# Patient Record
Sex: MALE | Race: WHITE | Employment: OTHER | URBAN - NONMETROPOLITAN AREA
[De-identification: names, ages, dates, MRNs, and addresses within clinical notes are randomized per-mention and may not be internally consistent; named-entity substitution may affect disease eponyms.]

---

## 2017-01-20 RX ORDER — METFORMIN HYDROCHLORIDE 500 MG/1
TABLET, EXTENDED RELEASE ORAL
Qty: 30 TABLET | Refills: 3 | Status: SHIPPED | OUTPATIENT
Start: 2017-01-20 | End: 2017-12-29

## 2017-01-20 RX ORDER — MOMETASONE FUROATE AND FORMOTEROL FUMARATE DIHYDRATE 200; 5 UG/1; UG/1
AEROSOL RESPIRATORY (INHALATION)
Qty: 13 INHALER | Refills: 5 | Status: SHIPPED | OUTPATIENT
Start: 2017-01-20 | End: 2017-08-02 | Stop reason: SDUPTHER

## 2017-01-20 RX ORDER — ERGOCALCIFEROL 1.25 MG/1
CAPSULE ORAL
Qty: 8 CAPSULE | Refills: 5 | Status: SHIPPED | OUTPATIENT
Start: 2017-01-20 | End: 2017-09-11 | Stop reason: SDUPTHER

## 2017-05-05 ENCOUNTER — OFFICE VISIT (OUTPATIENT)
Dept: FAMILY MEDICINE CLINIC | Age: 42
End: 2017-05-05

## 2017-05-05 VITALS
SYSTOLIC BLOOD PRESSURE: 134 MMHG | RESPIRATION RATE: 18 BRPM | TEMPERATURE: 97.9 F | HEART RATE: 80 BPM | OXYGEN SATURATION: 96 % | WEIGHT: 315 LBS | DIASTOLIC BLOOD PRESSURE: 80 MMHG | HEIGHT: 70 IN | BODY MASS INDEX: 45.1 KG/M2

## 2017-05-05 DIAGNOSIS — E66.01 MORBID OBESITY, UNSPECIFIED OBESITY TYPE (HCC): ICD-10-CM

## 2017-05-05 DIAGNOSIS — J44.9 CHRONIC OBSTRUCTIVE PULMONARY DISEASE, UNSPECIFIED COPD TYPE (HCC): ICD-10-CM

## 2017-05-05 DIAGNOSIS — Z15.89 MTHFR MUTATION (METHYLENETETRAHYDROFOLATE REDUCTASE): ICD-10-CM

## 2017-05-05 DIAGNOSIS — G47.33 OSA (OBSTRUCTIVE SLEEP APNEA): ICD-10-CM

## 2017-05-05 DIAGNOSIS — M79.7 FIBROMYALGIA SYNDROME: ICD-10-CM

## 2017-05-05 DIAGNOSIS — E11.9 TYPE 2 DIABETES MELLITUS WITHOUT COMPLICATION, WITHOUT LONG-TERM CURRENT USE OF INSULIN (HCC): Primary | ICD-10-CM

## 2017-05-05 LAB
CREATININE URINE POCT: 200
HBA1C MFR BLD: 6.9 %
MICROALBUMIN/CREAT 24H UR: 30 MG/G{CREAT}
MICROALBUMIN/CREAT UR-RTO: <30

## 2017-05-05 PROCEDURE — 83036 HEMOGLOBIN GLYCOSYLATED A1C: CPT | Performed by: NURSE PRACTITIONER

## 2017-05-05 PROCEDURE — 99214 OFFICE O/P EST MOD 30 MIN: CPT | Performed by: NURSE PRACTITIONER

## 2017-05-05 PROCEDURE — 82044 UR ALBUMIN SEMIQUANTITATIVE: CPT | Performed by: NURSE PRACTITIONER

## 2017-05-05 RX ORDER — WHEELCHAIR
EACH MISCELLANEOUS
Qty: 1 EACH | Refills: 0 | Status: SHIPPED | OUTPATIENT
Start: 2017-05-05 | End: 2017-12-29 | Stop reason: ALTCHOICE

## 2017-05-05 RX ORDER — ATORVASTATIN CALCIUM 10 MG/1
10 TABLET, FILM COATED ORAL DAILY
Qty: 30 TABLET | Refills: 11 | Status: SHIPPED | OUTPATIENT
Start: 2017-05-05 | End: 2017-12-29 | Stop reason: ALTCHOICE

## 2017-05-05 RX ORDER — LISINOPRIL 2.5 MG/1
2.5 TABLET ORAL DAILY
Qty: 30 TABLET | Refills: 11 | Status: SHIPPED | OUTPATIENT
Start: 2017-05-05 | End: 2017-12-29

## 2017-05-05 ASSESSMENT — PATIENT HEALTH QUESTIONNAIRE - PHQ9
SUM OF ALL RESPONSES TO PHQ9 QUESTIONS 1 & 2: 0
SUM OF ALL RESPONSES TO PHQ QUESTIONS 1-9: 0
1. LITTLE INTEREST OR PLEASURE IN DOING THINGS: 0
2. FEELING DOWN, DEPRESSED OR HOPELESS: 0

## 2017-05-05 ASSESSMENT — ENCOUNTER SYMPTOMS
EYES NEGATIVE: 1
RESPIRATORY NEGATIVE: 1
GASTROINTESTINAL NEGATIVE: 1

## 2017-05-18 RX ORDER — NITROGLYCERIN 0.4 MG/1
TABLET SUBLINGUAL
Qty: 25 TABLET | Refills: 0 | Status: SHIPPED | OUTPATIENT
Start: 2017-05-18

## 2017-06-05 RX ORDER — EPINEPHRINE 0.3 MG/.3ML
0.3 INJECTION SUBCUTANEOUS ONCE
Qty: 0.3 ML | Refills: 0 | Status: SHIPPED | OUTPATIENT
Start: 2017-06-05 | End: 2017-12-29 | Stop reason: SDUPTHER

## 2017-07-08 ENCOUNTER — TELEPHONE (OUTPATIENT)
Dept: ENDOCRINOLOGY | Age: 42
End: 2017-07-08

## 2017-08-02 RX ORDER — MOMETASONE FUROATE AND FORMOTEROL FUMARATE DIHYDRATE 200; 5 UG/1; UG/1
AEROSOL RESPIRATORY (INHALATION)
Qty: 13 INHALER | Refills: 5 | Status: SHIPPED | OUTPATIENT
Start: 2017-08-02 | End: 2018-02-18 | Stop reason: SDUPTHER

## 2017-08-16 ENCOUNTER — TELEPHONE (OUTPATIENT)
Dept: FAMILY MEDICINE CLINIC | Age: 42
End: 2017-08-16

## 2017-08-16 DIAGNOSIS — G47.33 OSA (OBSTRUCTIVE SLEEP APNEA): Primary | ICD-10-CM

## 2017-08-23 ENCOUNTER — OFFICE VISIT (OUTPATIENT)
Dept: FAMILY MEDICINE CLINIC | Age: 42
End: 2017-08-23
Payer: COMMERCIAL

## 2017-08-23 VITALS
SYSTOLIC BLOOD PRESSURE: 146 MMHG | OXYGEN SATURATION: 95 % | HEART RATE: 83 BPM | HEIGHT: 70 IN | WEIGHT: 315 LBS | RESPIRATION RATE: 20 BRPM | BODY MASS INDEX: 45.1 KG/M2 | DIASTOLIC BLOOD PRESSURE: 84 MMHG

## 2017-08-23 DIAGNOSIS — M79.7 FIBROMYALGIA SYNDROME: Primary | ICD-10-CM

## 2017-08-23 DIAGNOSIS — E11.9 TYPE 2 DIABETES MELLITUS WITHOUT COMPLICATION, WITHOUT LONG-TERM CURRENT USE OF INSULIN (HCC): ICD-10-CM

## 2017-08-23 DIAGNOSIS — G47.33 OSA (OBSTRUCTIVE SLEEP APNEA): ICD-10-CM

## 2017-08-23 DIAGNOSIS — F95.2 TOURETTE SYNDROME: ICD-10-CM

## 2017-08-23 DIAGNOSIS — J44.9 CHRONIC OBSTRUCTIVE PULMONARY DISEASE, UNSPECIFIED COPD TYPE (HCC): ICD-10-CM

## 2017-08-23 PROCEDURE — 99214 OFFICE O/P EST MOD 30 MIN: CPT | Performed by: NURSE PRACTITIONER

## 2017-08-23 RX ORDER — BLOOD-GLUCOSE METER
KIT MISCELLANEOUS
Qty: 1 KIT | Refills: 3 | Status: SHIPPED | OUTPATIENT
Start: 2017-08-23

## 2017-08-23 ASSESSMENT — ENCOUNTER SYMPTOMS
GASTROINTESTINAL NEGATIVE: 1
RESPIRATORY NEGATIVE: 1
EYES NEGATIVE: 1

## 2017-09-11 RX ORDER — ERGOCALCIFEROL 1.25 MG/1
CAPSULE ORAL
Qty: 8 CAPSULE | Refills: 5 | Status: SHIPPED | OUTPATIENT
Start: 2017-09-11 | End: 2018-08-06 | Stop reason: SDUPTHER

## 2017-09-19 ENCOUNTER — TELEPHONE (OUTPATIENT)
Dept: FAMILY MEDICINE CLINIC | Age: 42
End: 2017-09-19

## 2017-11-20 ENCOUNTER — TELEPHONE (OUTPATIENT)
Dept: FAMILY MEDICINE CLINIC | Age: 42
End: 2017-11-20

## 2017-11-20 NOTE — TELEPHONE ENCOUNTER
Left message for pt to return call - a letter was received from [facility] that a face to face appt is needed for eval and documentation that pt is benefiting from CPAP therapy - this face to face appt must be scheduled 31 days after initiating therapy and before the 91st day of therapy - Therapy started 10-04-17 - Deadline date is 01-04-18

## 2017-12-04 NOTE — TELEPHONE ENCOUNTER
Left detailed message - Pt's insurance is requiring a face to face appt with PCP to document that pt is benefiting from CPAP therapy - appt must be scheduled prior to 1-4-18 - pt needs to return call to make appt

## 2017-12-06 ENCOUNTER — CARE COORDINATION (OUTPATIENT)
Dept: CARE COORDINATION | Age: 42
End: 2017-12-06

## 2017-12-06 NOTE — CARE COORDINATION
Attempted to reach patient to establish Care Coordination s/p recent letter that was mailed. Patient was not available at the time of my call and generic voicemail message was left asking patient to please return call to my direct number.   Bandar Oneil RN Care Coordinator

## 2017-12-21 ENCOUNTER — TELEPHONE (OUTPATIENT)
Dept: FAMILY MEDICINE CLINIC | Age: 42
End: 2017-12-21

## 2017-12-21 ENCOUNTER — CARE COORDINATION (OUTPATIENT)
Dept: CARE COORDINATION | Age: 42
End: 2017-12-21

## 2017-12-21 NOTE — TELEPHONE ENCOUNTER
Letter mailed to pt - instructed to schedule appt with Liu prior to 1-4-18 - face to face appt required by pt's insurance, [place] for continuation of CPAP    Several attempts made to reach pt by phone

## 2017-12-21 NOTE — CARE COORDINATION
Attempted to reach patient to establish Care Coordination s/p recent letter that was mailed. Patient was not available at the time of my call and generic voicemail message was left asking patient to please return call to my direct number.     Kelsey Eldridge, RN Care Coordinator

## 2017-12-29 ENCOUNTER — OFFICE VISIT (OUTPATIENT)
Dept: FAMILY MEDICINE CLINIC | Age: 42
End: 2017-12-29
Payer: COMMERCIAL

## 2017-12-29 VITALS
DIASTOLIC BLOOD PRESSURE: 82 MMHG | HEIGHT: 70 IN | SYSTOLIC BLOOD PRESSURE: 140 MMHG | HEART RATE: 96 BPM | BODY MASS INDEX: 45.1 KG/M2 | RESPIRATION RATE: 18 BRPM | WEIGHT: 315 LBS

## 2017-12-29 DIAGNOSIS — J44.9 CHRONIC OBSTRUCTIVE PULMONARY DISEASE, UNSPECIFIED COPD TYPE (HCC): ICD-10-CM

## 2017-12-29 DIAGNOSIS — E11.9 TYPE 2 DIABETES MELLITUS WITHOUT COMPLICATION, WITHOUT LONG-TERM CURRENT USE OF INSULIN (HCC): Primary | ICD-10-CM

## 2017-12-29 DIAGNOSIS — F31.9 BIPOLAR 1 DISORDER (HCC): ICD-10-CM

## 2017-12-29 DIAGNOSIS — Z23 NEED FOR INFLUENZA VACCINATION: ICD-10-CM

## 2017-12-29 DIAGNOSIS — Z12.5 SCREENING FOR PROSTATE CANCER: ICD-10-CM

## 2017-12-29 DIAGNOSIS — G47.33 OSA (OBSTRUCTIVE SLEEP APNEA): ICD-10-CM

## 2017-12-29 PROCEDURE — 99214 OFFICE O/P EST MOD 30 MIN: CPT | Performed by: NURSE PRACTITIONER

## 2017-12-29 PROCEDURE — 90686 IIV4 VACC NO PRSV 0.5 ML IM: CPT | Performed by: NURSE PRACTITIONER

## 2017-12-29 PROCEDURE — 90471 IMMUNIZATION ADMIN: CPT | Performed by: NURSE PRACTITIONER

## 2017-12-29 RX ORDER — ATORVASTATIN CALCIUM 10 MG/1
10 TABLET, FILM COATED ORAL DAILY
Qty: 30 TABLET | Refills: 11 | Status: SHIPPED | OUTPATIENT
Start: 2017-12-29 | End: 2019-03-14 | Stop reason: SDUPTHER

## 2017-12-29 RX ORDER — FAMOTIDINE 20 MG/1
20 TABLET, FILM COATED ORAL 2 TIMES DAILY
Qty: 60 TABLET | Refills: 3 | Status: SHIPPED | OUTPATIENT
Start: 2017-12-29 | End: 2019-03-14 | Stop reason: ALTCHOICE

## 2017-12-29 RX ORDER — ALBUTEROL SULFATE 2.5 MG/3ML
SOLUTION RESPIRATORY (INHALATION)
Qty: 75 ML | Refills: 1 | Status: SHIPPED | OUTPATIENT
Start: 2017-12-29 | End: 2018-08-23 | Stop reason: SDUPTHER

## 2017-12-29 RX ORDER — LISINOPRIL 2.5 MG/1
2.5 TABLET ORAL DAILY
Qty: 30 TABLET | Refills: 11 | Status: SHIPPED | OUTPATIENT
Start: 2017-12-29 | End: 2019-03-14 | Stop reason: SDUPTHER

## 2017-12-29 ASSESSMENT — ENCOUNTER SYMPTOMS
EYES NEGATIVE: 1
GASTROINTESTINAL NEGATIVE: 1
RESPIRATORY NEGATIVE: 1

## 2017-12-29 NOTE — PROGRESS NOTES
Subjective:      Patient ID: Robert Herman is a 38 y.o. male. HPI  Chief Complaint   Patient presents with    Other     f/u CPAP approval      Patient's medications, allergies, past medical, surgical, social and family histories were reviewed and updated as appropriate.   Patient Active Problem List   Diagnosis    Fibromyalgia syndrome    Tourette syndrome    Asthma    Restless legs syndrome (RLS)    Irritable bowel syndrome    Arthritis- multiple joints-under [facility] it by [facility]    Adrenal adenoma- under evaluation and F/[facility]    Chest pain, atypical -Noncardiac    Abnormal nuclear cardiac imaging test-borderline for ischemia in the anterior wall-with negative cath    Tobacco dependence    Morbid [facility]    S/P cardiac cath- may 2012- minimal nonobstructive CAD- prox and ostial LAD    Cardiomyopathy- nonischemic-EF 40% by echo and cath 52% by [facility]    Hypogonadism male    COPD (chronic obstructive pulmonary disease) ([facility]    MTHFR mutation (methylenetetrahydrofolate reductase) ([facility]    Bipolar 1 disorder ([facility]    Schizophrenia ([facility]    Obesity due to excess calories    Type 2 diabetes mellitus without complication, without long-term current use of insulin ([facility]    Adrenal adenoma     Allergies   Allergen Reactions    Allopurinol      Sore throat      Olanzapine     Seroquel [Quetiapine Fumarate]      Health Maintenance   Topic Date Due    HIV screen  04/18/1988    DTaP/Tdap/Td vaccine (1 - Tdap) 04/18/1992    Lipid screen  06/24/2017    Flu vaccine (1) 09/01/2017    Diabetic retinal exam  09/26/2017    Diabetic hemoglobin A1C test  05/05/2018    Diabetic microalbuminuria test  05/05/2018    Diabetic foot exam  08/23/2018    Pneumococcal med risk  Completed     Current Outpatient Prescriptions   Medication Sig Dispense Refill    PROAIR  (90 Base) MCG/ACT inhaler inhale 2 puffs by mouth every 6 hours if needed for wheezing Negative. Musculoskeletal: Positive for arthralgias. Skin: Negative. Neurological: Negative. Psychiatric/Behavioral: Positive for hallucinations. The patient is nervous/anxious. Objective:   Physical Exam   Constitutional: He is oriented to person, place, and time. He appears well-developed and well-nourished. Morbidly obese   HENT:   Head: Normocephalic. Right Ear: Tympanic membrane and external ear normal.   Left Ear: Tympanic membrane and external ear normal.   Nose: Nose normal.   Mouth/Throat: Oropharynx is clear and moist.   Neck: Normal range of motion. Neck supple. Cardiovascular: Normal rate, regular rhythm, normal heart sounds and intact distal pulses. Exam reveals no gallop and no friction rub. No murmur heard. Pulmonary/Chest: Effort normal and breath sounds normal. He has no wheezes. He has no rales. Abdominal: Soft. Bowel sounds are normal. There is no tenderness. There is no guarding. Musculoskeletal: Normal range of motion. Lymphadenopathy:     He has no cervical adenopathy. Neurological: He is alert and oriented to person, place, and time. He has normal reflexes. Skin: Skin is warm. Psychiatric: His mood appears anxious. Agitated: tardive dyskinesia        Assessment:      1. Type 2 diabetes mellitus without complication, without long-term current use of insulin (HCC)  lisinopril (ZESTRIL) 2.5 MG tablet    atorvastatin (LIPITOR) 10 MG tablet    CBC Auto Differential    Comprehensive Metabolic Panel    Lipid Panel    Hemoglobin A1C    Microalbumin / Creatinine Urine Ratio   2. Need for influenza vaccination  INFLUENZA, QUADV, 3 YRS AND OLDER, IM, PF, PREFILL SYR OR SDV, 0.5ML (FLUZONE QUADV, PF)   3. GLORIA (obstructive sleep apnea)     4. Chronic obstructive pulmonary disease, unspecified COPD type ([address]     5. Bipolar 1 disorder ([address]     6.  Screening for prostate cancer  PSA Screening           Plan:      Pt to cont the cpap  Get labs  Need to restart ace and statin  Fu in 3months

## 2017-12-29 NOTE — PROGRESS NOTES
After obtaining consent, and per orders of Liu Washington CNP, injection of Fluzone 0.5mL IM given in Right deltoid by Michael Officer. Patient instructed to report any adverse reaction to me immediately. VIS given. Consent signed.

## 2018-01-09 ENCOUNTER — CARE COORDINATION (OUTPATIENT)
Dept: CARE COORDINATION | Age: 43
End: 2018-01-09

## 2018-01-09 NOTE — LETTER
1/11/2018    [address] [facility] [address] [id number]    I am following up on my previous contact. I wanted to introduce myself and the care coordination program offered here at [facility], NP's office. Our goal is to support you in your efforts to be as healthy as possible. [facility] ([facility] is committed to walk with you on your journey to better health. Please let me know if you have any questions or if you would like to schedule an appointment with me. You can reach me at the numbers listed below.      In good health,     Regards,      Bridger Vogt, RN   [phone]

## 2018-01-15 RX ORDER — TIOTROPIUM BROMIDE INHALATION SPRAY 3.12 UG/1
SPRAY, METERED RESPIRATORY (INHALATION)
Qty: 4 G | Refills: 11 | Status: SHIPPED | OUTPATIENT
Start: 2018-01-15 | End: 2019-03-14 | Stop reason: SDUPTHER

## 2018-01-16 ENCOUNTER — TELEPHONE (OUTPATIENT)
Dept: ENDOCRINOLOGY | Age: 43
End: 2018-01-16

## 2018-02-19 RX ORDER — MOMETASONE FUROATE AND FORMOTEROL FUMARATE DIHYDRATE 200; 5 UG/1; UG/1
AEROSOL RESPIRATORY (INHALATION)
Qty: 13 G | Refills: 5 | Status: SHIPPED | OUTPATIENT
Start: 2018-02-19 | End: 2018-08-16 | Stop reason: SDUPTHER

## 2018-05-11 ENCOUNTER — TELEPHONE (OUTPATIENT)
Dept: FAMILY MEDICINE CLINIC | Age: 43
End: 2018-05-11

## 2018-05-15 ENCOUNTER — TELEPHONE (OUTPATIENT)
Dept: FAMILY MEDICINE CLINIC | Age: 43
End: 2018-05-15

## 2018-08-07 RX ORDER — ERGOCALCIFEROL 1.25 MG/1
CAPSULE ORAL
Qty: 8 CAPSULE | Refills: 5 | Status: SHIPPED | OUTPATIENT
Start: 2018-08-07 | End: 2019-09-14 | Stop reason: SDUPTHER

## 2018-08-18 RX ORDER — MOMETASONE FUROATE AND FORMOTEROL FUMARATE DIHYDRATE 200; 5 UG/1; UG/1
AEROSOL RESPIRATORY (INHALATION)
Qty: 13 G | Refills: 5 | Status: SHIPPED | OUTPATIENT
Start: 2018-08-18 | End: 2019-03-14 | Stop reason: SDUPTHER

## 2018-08-23 RX ORDER — ALBUTEROL SULFATE 2.5 MG/3ML
SOLUTION RESPIRATORY (INHALATION)
Qty: 75 ML | Refills: 1 | Status: SHIPPED | OUTPATIENT
Start: 2018-08-23 | End: 2019-01-04 | Stop reason: SDUPTHER

## 2018-12-05 ENCOUNTER — TELEPHONE (OUTPATIENT)
Dept: FAMILY MEDICINE CLINIC | Age: 43
End: 2018-12-05

## 2019-01-07 RX ORDER — ALBUTEROL SULFATE 2.5 MG/3ML
SOLUTION RESPIRATORY (INHALATION)
Qty: 75 ML | Refills: 1 | Status: SHIPPED | OUTPATIENT
Start: 2019-01-07 | End: 2020-06-22

## 2019-02-28 RX ORDER — TIOTROPIUM BROMIDE INHALATION SPRAY 3.12 UG/1
SPRAY, METERED RESPIRATORY (INHALATION)
Qty: 4 G | Refills: 11 | OUTPATIENT
Start: 2019-02-28

## 2019-03-14 ENCOUNTER — OFFICE VISIT (OUTPATIENT)
Dept: FAMILY MEDICINE CLINIC | Age: 44
End: 2019-03-14
Payer: COMMERCIAL

## 2019-03-14 VITALS
TEMPERATURE: 97.6 F | SYSTOLIC BLOOD PRESSURE: 138 MMHG | DIASTOLIC BLOOD PRESSURE: 84 MMHG | HEART RATE: 80 BPM | WEIGHT: 315 LBS | RESPIRATION RATE: 16 BRPM | HEIGHT: 70 IN | BODY MASS INDEX: 45.1 KG/M2

## 2019-03-14 DIAGNOSIS — K58.0 IRRITABLE BOWEL SYNDROME WITH DIARRHEA: ICD-10-CM

## 2019-03-14 DIAGNOSIS — J44.9 CHRONIC OBSTRUCTIVE PULMONARY DISEASE, UNSPECIFIED COPD TYPE (HCC): ICD-10-CM

## 2019-03-14 DIAGNOSIS — M79.7 FIBROMYALGIA SYNDROME: ICD-10-CM

## 2019-03-14 DIAGNOSIS — F95.2 TOURETTE SYNDROME: ICD-10-CM

## 2019-03-14 DIAGNOSIS — G47.33 OSA (OBSTRUCTIVE SLEEP APNEA): ICD-10-CM

## 2019-03-14 DIAGNOSIS — F31.9 BIPOLAR 1 DISORDER (HCC): ICD-10-CM

## 2019-03-14 DIAGNOSIS — E11.9 TYPE 2 DIABETES MELLITUS WITHOUT COMPLICATION, WITHOUT LONG-TERM CURRENT USE OF INSULIN (HCC): Primary | ICD-10-CM

## 2019-03-14 LAB — HBA1C MFR BLD: 8.2 %

## 2019-03-14 PROCEDURE — 99214 OFFICE O/P EST MOD 30 MIN: CPT | Performed by: NURSE PRACTITIONER

## 2019-03-14 PROCEDURE — 83036 HEMOGLOBIN GLYCOSYLATED A1C: CPT | Performed by: NURSE PRACTITIONER

## 2019-03-14 RX ORDER — LISINOPRIL 2.5 MG/1
2.5 TABLET ORAL DAILY
Qty: 30 TABLET | Refills: 11 | Status: SHIPPED | OUTPATIENT
Start: 2019-03-14 | End: 2020-04-15

## 2019-03-14 RX ORDER — DICYCLOMINE HYDROCHLORIDE 10 MG/1
10 CAPSULE ORAL 4 TIMES DAILY
Qty: 360 CAPSULE | Refills: 1 | Status: SHIPPED | OUTPATIENT
Start: 2019-03-14 | End: 2019-09-14 | Stop reason: SDUPTHER

## 2019-03-14 RX ORDER — ATORVASTATIN CALCIUM 10 MG/1
10 TABLET, FILM COATED ORAL DAILY
Qty: 30 TABLET | Refills: 11 | Status: SHIPPED | OUTPATIENT
Start: 2019-03-14 | End: 2020-04-15

## 2019-03-14 RX ORDER — ALBUTEROL SULFATE 90 UG/1
AEROSOL, METERED RESPIRATORY (INHALATION)
Qty: 8.5 G | Refills: 5 | Status: SHIPPED | OUTPATIENT
Start: 2019-03-14 | End: 2019-07-24 | Stop reason: SDUPTHER

## 2019-03-14 ASSESSMENT — ENCOUNTER SYMPTOMS
ABDOMINAL PAIN: 1
RESPIRATORY NEGATIVE: 1
EYES NEGATIVE: 1
DIARRHEA: 1

## 2019-07-24 DIAGNOSIS — J44.9 CHRONIC OBSTRUCTIVE PULMONARY DISEASE, UNSPECIFIED COPD TYPE (HCC): ICD-10-CM

## 2019-07-24 RX ORDER — ALBUTEROL SULFATE 90 UG/1
AEROSOL, METERED RESPIRATORY (INHALATION)
Qty: 8.5 G | Refills: 5 | Status: SHIPPED | OUTPATIENT
Start: 2019-07-24 | End: 2019-11-19 | Stop reason: SDUPTHER

## 2019-09-14 DIAGNOSIS — E11.9 TYPE 2 DIABETES MELLITUS WITHOUT COMPLICATION, WITHOUT LONG-TERM CURRENT USE OF INSULIN (HCC): ICD-10-CM

## 2019-09-14 DIAGNOSIS — K58.0 IRRITABLE BOWEL SYNDROME WITH DIARRHEA: ICD-10-CM

## 2019-09-16 RX ORDER — DICYCLOMINE HYDROCHLORIDE 10 MG/1
CAPSULE ORAL
Qty: 360 CAPSULE | Refills: 1 | Status: SHIPPED | OUTPATIENT
Start: 2019-09-16 | End: 2020-04-15

## 2019-09-16 RX ORDER — ERGOCALCIFEROL 1.25 MG/1
CAPSULE ORAL
Qty: 8 CAPSULE | Refills: 5 | Status: SHIPPED | OUTPATIENT
Start: 2019-09-16 | End: 2020-02-17 | Stop reason: SDUPTHER

## 2019-10-13 DIAGNOSIS — J44.9 CHRONIC OBSTRUCTIVE PULMONARY DISEASE, UNSPECIFIED COPD TYPE (HCC): ICD-10-CM

## 2019-11-19 DIAGNOSIS — J44.9 CHRONIC OBSTRUCTIVE PULMONARY DISEASE, UNSPECIFIED COPD TYPE (HCC): ICD-10-CM

## 2019-11-19 RX ORDER — ALBUTEROL SULFATE 90 UG/1
AEROSOL, METERED RESPIRATORY (INHALATION)
Qty: 8.5 G | Refills: 0 | Status: SHIPPED | OUTPATIENT
Start: 2019-11-19 | End: 2019-12-07 | Stop reason: SDUPTHER

## 2019-12-07 DIAGNOSIS — J44.9 CHRONIC OBSTRUCTIVE PULMONARY DISEASE, UNSPECIFIED COPD TYPE (HCC): ICD-10-CM

## 2019-12-09 RX ORDER — ALBUTEROL SULFATE 90 UG/1
AEROSOL, METERED RESPIRATORY (INHALATION)
Qty: 8.5 G | Refills: 0 | Status: SHIPPED | OUTPATIENT
Start: 2019-12-09 | End: 2019-12-26

## 2019-12-26 DIAGNOSIS — J44.9 CHRONIC OBSTRUCTIVE PULMONARY DISEASE, UNSPECIFIED COPD TYPE (HCC): ICD-10-CM

## 2019-12-26 RX ORDER — ALBUTEROL SULFATE 90 UG/1
AEROSOL, METERED RESPIRATORY (INHALATION)
Qty: 8.5 G | Refills: 0 | Status: SHIPPED | OUTPATIENT
Start: 2019-12-26 | End: 2020-01-13

## 2020-01-13 RX ORDER — ALBUTEROL SULFATE 90 UG/1
AEROSOL, METERED RESPIRATORY (INHALATION)
Qty: 8.5 G | Refills: 0 | Status: SHIPPED | OUTPATIENT
Start: 2020-01-13 | End: 2020-01-30

## 2020-01-30 RX ORDER — ALBUTEROL SULFATE 90 UG/1
AEROSOL, METERED RESPIRATORY (INHALATION)
Qty: 8.5 G | Refills: 0 | Status: SHIPPED | OUTPATIENT
Start: 2020-01-30 | End: 2020-02-24

## 2020-02-12 ENCOUNTER — OFFICE VISIT (OUTPATIENT)
Dept: FAMILY MEDICINE CLINIC | Age: 45
End: 2020-02-12
Payer: COMMERCIAL

## 2020-02-12 ENCOUNTER — NURSE ONLY (OUTPATIENT)
Dept: LAB | Age: 45
End: 2020-02-12

## 2020-02-12 VITALS
TEMPERATURE: 97.3 F | SYSTOLIC BLOOD PRESSURE: 132 MMHG | HEIGHT: 70 IN | HEART RATE: 88 BPM | RESPIRATION RATE: 16 BRPM | WEIGHT: 315 LBS | DIASTOLIC BLOOD PRESSURE: 88 MMHG | BODY MASS INDEX: 45.1 KG/M2

## 2020-02-12 LAB
ALBUMIN SERPL-MCNC: 4.3 G/DL (ref 3.5–5.1)
ALP BLD-CCNC: 76 U/L (ref 38–126)
ALT SERPL-CCNC: 27 U/L (ref 11–66)
ANION GAP SERPL CALCULATED.3IONS-SCNC: 12 MEQ/L (ref 8–16)
AST SERPL-CCNC: 20 U/L (ref 5–40)
BASOPHILS # BLD: 0.7 %
BASOPHILS ABSOLUTE: 0.1 THOU/MM3 (ref 0–0.1)
BILIRUB SERPL-MCNC: 0.4 MG/DL (ref 0.3–1.2)
BUN BLDV-MCNC: 15 MG/DL (ref 7–22)
CALCIUM SERPL-MCNC: 9 MG/DL (ref 8.5–10.5)
CHLORIDE BLD-SCNC: 103 MEQ/L (ref 98–111)
CHOLESTEROL, TOTAL: 177 MG/DL (ref 100–199)
CO2: 26 MEQ/L (ref 23–33)
CREAT SERPL-MCNC: 0.7 MG/DL (ref 0.4–1.2)
CREATININE, URINE: 161.6 MG/DL
EOSINOPHIL # BLD: 1.3 %
EOSINOPHILS ABSOLUTE: 0.1 THOU/MM3 (ref 0–0.4)
ERYTHROCYTE [DISTWIDTH] IN BLOOD BY AUTOMATED COUNT: 14.3 % (ref 11.5–14.5)
ERYTHROCYTE [DISTWIDTH] IN BLOOD BY AUTOMATED COUNT: 53.5 FL (ref 35–45)
GFR SERPL CREATININE-BSD FRML MDRD: > 90 ML/MIN/1.73M2
GLUCOSE BLD-MCNC: 126 MG/DL (ref 70–108)
HCT VFR BLD CALC: 53.9 % (ref 42–52)
HDLC SERPL-MCNC: 33 MG/DL
HEMOGLOBIN: 17.3 GM/DL (ref 14–18)
IMMATURE GRANS (ABS): 0.03 THOU/MM3 (ref 0–0.07)
IMMATURE GRANULOCYTES: 0.3 %
LDL CHOLESTEROL CALCULATED: 105 MG/DL
LYMPHOCYTES # BLD: 23.3 %
LYMPHOCYTES ABSOLUTE: 2.4 THOU/MM3 (ref 1–4.8)
MCH RBC QN AUTO: 32.3 PG (ref 26–33)
MCHC RBC AUTO-ENTMCNC: 32.1 GM/DL (ref 32.2–35.5)
MCV RBC AUTO: 100.7 FL (ref 80–94)
MICROALBUMIN UR-MCNC: < 1.2 MG/DL
MICROALBUMIN/CREAT UR-RTO: 7 MG/G (ref 0–30)
MONOCYTES # BLD: 6.9 %
MONOCYTES ABSOLUTE: 0.7 THOU/MM3 (ref 0.4–1.3)
NUCLEATED RED BLOOD CELLS: 0 /100 WBC
PLATELET # BLD: 265 THOU/MM3 (ref 130–400)
PMV BLD AUTO: 9.7 FL (ref 9.4–12.4)
POTASSIUM SERPL-SCNC: 4.3 MEQ/L (ref 3.5–5.2)
RBC # BLD: 5.35 MILL/MM3 (ref 4.7–6.1)
SEG NEUTROPHILS: 67.5 %
SEGMENTED NEUTROPHILS ABSOLUTE COUNT: 7 THOU/MM3 (ref 1.8–7.7)
SODIUM BLD-SCNC: 141 MEQ/L (ref 135–145)
TOTAL PROTEIN: 7.4 G/DL (ref 6.1–8)
TRIGL SERPL-MCNC: 193 MG/DL (ref 0–199)
WBC # BLD: 10.4 THOU/MM3 (ref 4.8–10.8)

## 2020-02-12 PROCEDURE — 99214 OFFICE O/P EST MOD 30 MIN: CPT | Performed by: NURSE PRACTITIONER

## 2020-02-12 RX ORDER — FLUOCINOLONE ACETONIDE 0.1 MG/ML
SOLUTION TOPICAL
Qty: 90 ML | Refills: 1 | Status: SHIPPED | OUTPATIENT
Start: 2020-02-12 | End: 2021-05-11

## 2020-02-12 ASSESSMENT — ENCOUNTER SYMPTOMS
COLOR CHANGE: 1
EYES NEGATIVE: 1
GASTROINTESTINAL NEGATIVE: 1
RESPIRATORY NEGATIVE: 1

## 2020-02-12 NOTE — PROGRESS NOTES
mometasone-formoterol (DULERA) 200-5 MCG/ACT inhaler inhale 2 puffs by mouth every 12 hours 13 g 5    vitamin D (ERGOCALCIFEROL) 13842 units CAPS capsule TAKE 1 CAPSULE BY MOUTH TWO TIMES PER WEEK 8 capsule 5    tiotropium (SPIRIVA RESPIMAT) 2.5 MCG/ACT AERS inhaler inhale 2 puffs by mouth once daily 4 g 11    albuterol (PROVENTIL) (2.5 MG/3ML) 0.083% nebulizer solution inhale contents of 1 vial in nebulizer every 6 hours if needed 75 mL 1    glucose monitoring kit (FREESTYLE) monitoring kit Dispense glucometer with test strips and lancets. #100, check BS daily 1 kit 3    NITROSTAT 0.4 MG SL tablet place 1 tablet under the tongue if needed every 5 minutes for chest pain for 3 doses IF NO RELIEF AFTER 3RD DOSE CALL PRESCRIBER . 25 tablet 0    Respiratory Therapy Supplies ISAIAH cpap and supplies, dx GLORIA 1 Device 0    Nebulizers (COMPRESSOR/NEBULIZER) MISC Nebulizer with tubing dx asthma 1 each 3    EPINEPHrine (EPIPEN) 0.3 MG/0.3ML ISAIAH injection Inject 0.3 mLs into the muscle once as needed for 1 dose. 1 Device 3    dicyclomine (BENTYL) 10 MG capsule take 1 capsule by mouth four times a day (Patient not taking: Reported on 2/12/2020) 360 capsule 1    lisinopril (ZESTRIL) 2.5 MG tablet Take 1 tablet by mouth daily (Patient not taking: Reported on 2/12/2020) 30 tablet 11    atorvastatin (LIPITOR) 10 MG tablet Take 1 tablet by mouth daily (Patient not taking: Reported on 2/12/2020) 30 tablet 11    canagliflozin (INVOKANA) 100 MG TABS tablet Take 1 tablet by mouth every morning (before breakfast) (Patient not taking: Reported on 2/12/2020) 90 tablet 1    omeprazole (PRILOSEC) 20 MG capsule Take 20 mg by mouth daily as needed        No current facility-administered medications for this visit.       Allergies   Allergen Reactions    Allopurinol      Sore throat      Olanzapine     Quetiapine     Seroquel [Quetiapine Fumarate]      Health Maintenance   Topic Date Due    DTaP/Tdap/Td vaccine (1 - Tdap) Normal range of motion. Feet:      Right foot:      Skin integrity: No ulcer, blister or skin breakdown. Left foot:      Skin integrity: No ulcer, blister or skin breakdown. Lymphadenopathy:      Cervical: No cervical adenopathy. Skin:     General: Skin is warm. Neurological:      Mental Status: He is alert and oriented to person, place, and time. Deep Tendon Reflexes: Reflexes are normal and symmetric. Assessment:      Diagnosis Orders   1. Type 2 diabetes mellitus without complication, without long-term current use of insulin ([facility])   DIABETES FOOT EXAM    CBC Auto Differential    Comprehensive Metabolic Panel    Hemoglobin A1C    Albumin, Random Urine    Lipid Panel    Vitamin D 25 Hydroxy   2. Chronic obstructive pulmonary disease, unspecified COPD type ([facility] [facility]     3. Irritable bowel syndrome with diarrhea     4. Fibromyalgia syndrome     5. Eczema of both external ears  fluocinolone acetonide (SYNALAR) 0.01 % external solution   6. Screening for prostate cancer  PSA Screening   7. Hypogonadism male  Testosterone   8. Vitamin D deficiency     9. Skin lesion         Plan:      No follow-ups on file.        Orders Placed This Encounter   Procedures    CBC Auto Differential     Standing Status:   Future     Number of Occurrences:   1     Standing Expiration Date:   2/11/2021    Comprehensive Metabolic Panel     Standing Status:   Future     Number of Occurrences:   1     Standing Expiration Date:   2/11/2021    Hemoglobin A1C     Standing Status:   Future     Number of Occurrences:   1     Standing Expiration Date:   2/11/2021    Albumin, Random Urine     Standing Status:   Future     Number of Occurrences:   1     Standing Expiration Date:   2/11/2021    Lipid Panel     Standing Status:   Future     Number of Occurrences:   1     Standing Expiration Date:   2/11/2021     Order Specific Question:   Is Patient Fasting?/# of Hours     Answer:   15    PSA Screening     Standing

## 2020-02-13 LAB
AVERAGE GLUCOSE: 150 MG/DL (ref 70–126)
HBA1C MFR BLD: 7 % (ref 4.4–6.4)
PROSTATE SPECIFIC ANTIGEN: 0.36 NG/ML (ref 0–1)
VITAMIN D 25-HYDROXY: 14 NG/ML (ref 30–100)

## 2020-02-15 LAB — TESTOSTERONE TOTAL: 348 NG/DL (ref 300–890)

## 2020-02-17 RX ORDER — ERGOCALCIFEROL 1.25 MG/1
CAPSULE ORAL
Qty: 8 CAPSULE | Refills: 5 | Status: SHIPPED | OUTPATIENT
Start: 2020-02-17 | End: 2021-03-01

## 2020-02-24 RX ORDER — ALBUTEROL SULFATE 90 UG/1
AEROSOL, METERED RESPIRATORY (INHALATION)
Qty: 8.5 G | Refills: 0 | Status: SHIPPED | OUTPATIENT
Start: 2020-02-24 | End: 2020-03-16

## 2020-03-16 RX ORDER — ALBUTEROL SULFATE 90 UG/1
AEROSOL, METERED RESPIRATORY (INHALATION)
Qty: 8.5 G | Refills: 0 | Status: SHIPPED | OUTPATIENT
Start: 2020-03-16 | End: 2020-04-02

## 2020-03-18 ENCOUNTER — PATIENT MESSAGE (OUTPATIENT)
Dept: FAMILY MEDICINE CLINIC | Age: 45
End: 2020-03-18

## 2020-04-02 RX ORDER — ALBUTEROL SULFATE 90 UG/1
AEROSOL, METERED RESPIRATORY (INHALATION)
Qty: 18 G | Refills: 3 | Status: SHIPPED | OUTPATIENT
Start: 2020-04-02 | End: 2020-06-22 | Stop reason: SDUPTHER

## 2020-04-14 ENCOUNTER — E-VISIT (OUTPATIENT)
Dept: FAMILY MEDICINE CLINIC | Age: 45
End: 2020-04-14
Payer: COMMERCIAL

## 2020-04-14 PROCEDURE — 98970 NQHP OL DIG ASSMT&MGMT 5-10: CPT | Performed by: NURSE PRACTITIONER

## 2020-04-15 ENCOUNTER — OFFICE VISIT (OUTPATIENT)
Dept: PRIMARY CARE CLINIC | Age: 45
End: 2020-04-15
Payer: COMMERCIAL

## 2020-04-15 VITALS
HEART RATE: 94 BPM | RESPIRATION RATE: 16 BRPM | HEIGHT: 70 IN | TEMPERATURE: 97.2 F | WEIGHT: 315 LBS | SYSTOLIC BLOOD PRESSURE: 130 MMHG | BODY MASS INDEX: 45.1 KG/M2 | DIASTOLIC BLOOD PRESSURE: 86 MMHG

## 2020-04-15 PROCEDURE — 99213 OFFICE O/P EST LOW 20 MIN: CPT | Performed by: FAMILY MEDICINE

## 2020-04-15 NOTE — PROGRESS NOTES
middle of the lower back. Lesion on left upper back with patient's observations stated as increasing diameter, increasing thickness, darkening color, exam of this area shows suspicious lesion pigmented uneven, raised, border irregular and asymmetrical size 15x15 mm noted on the right upper back. Skin tag on the left lower back irritated by clothing    Assessment:      Sonya Vogt was seen today for mole.     Diagnoses and all orders for this visit:    Skin lesion of back    Skin lesion    Skin lesion on examination    Skin tag      Schedule excision    Juan Buchanan MD

## 2020-04-28 ENCOUNTER — OFFICE VISIT (OUTPATIENT)
Dept: PRIMARY CARE CLINIC | Age: 45
End: 2020-04-28
Payer: COMMERCIAL

## 2020-04-28 VITALS
SYSTOLIC BLOOD PRESSURE: 132 MMHG | RESPIRATION RATE: 16 BRPM | HEART RATE: 92 BPM | DIASTOLIC BLOOD PRESSURE: 82 MMHG | BODY MASS INDEX: 55.55 KG/M2 | TEMPERATURE: 97.8 F | WEIGHT: 315 LBS

## 2020-04-28 PROCEDURE — 11200 RMVL SKIN TAGS UP TO&INC 15: CPT | Performed by: FAMILY MEDICINE

## 2020-04-28 PROCEDURE — 11300 SHAVE SKIN LESION 0.5 CM/<: CPT | Performed by: FAMILY MEDICINE

## 2020-04-28 PROCEDURE — 11301 SHAVE SKIN LESION 0.6-1.0 CM: CPT | Performed by: FAMILY MEDICINE

## 2020-04-28 PROCEDURE — 11303 SHAVE SKIN LESION >2.0 CM: CPT | Performed by: FAMILY MEDICINE

## 2020-04-28 RX ORDER — CEPHALEXIN 500 MG/1
500 CAPSULE ORAL 3 TIMES DAILY
Qty: 21 CAPSULE | Refills: 0 | Status: SHIPPED | OUTPATIENT
Start: 2020-04-28 | End: 2020-05-05

## 2020-04-28 NOTE — PROGRESS NOTES
without complications. Follow up: the specimen is labeled and sent to pathology for evaluation, return for suture removal in 10 days.

## 2020-05-08 ENCOUNTER — PROCEDURE VISIT (OUTPATIENT)
Dept: FAMILY MEDICINE CLINIC | Age: 45
End: 2020-05-08
Payer: COMMERCIAL

## 2020-05-08 VITALS
RESPIRATION RATE: 22 BRPM | OXYGEN SATURATION: 97 % | DIASTOLIC BLOOD PRESSURE: 70 MMHG | SYSTOLIC BLOOD PRESSURE: 132 MMHG | HEART RATE: 85 BPM

## 2020-05-08 PROCEDURE — 11200 RMVL SKIN TAGS UP TO&INC 15: CPT | Performed by: FAMILY MEDICINE

## 2020-05-08 NOTE — PROGRESS NOTES
SUBJECTIVE:   Barry Mckeon is a 50 y.o. male who presents for lesion removal. We have already discussed this procedure, including option of not performing surgery, technique of surgery and potential for scarring at a recent visit. OBJECTIVE:   Patient appears well. Vitals are normal.  Skin: large skin tags under the right axilla  2 are irritated with clothing and frequently bleed     ASSESSMENT:   Skin tags    PLAN:   After informed consent was obtained, using Betadine for cleansing and 1% Lidocaine with epinephrine for anesthetic, with sterile technique, shave excision was performed. Cautery was obtained with dry sol solution. Antibiotic dressing is applied, and wound care instructions provided. Be alert for any signs of cutaneous infection. The procedure was well tolerated without complications. Follow up: the patient may return prn.

## 2020-06-22 RX ORDER — ALBUTEROL SULFATE 90 UG/1
AEROSOL, METERED RESPIRATORY (INHALATION)
Qty: 18 G | Refills: 3 | Status: SHIPPED | OUTPATIENT
Start: 2020-06-22 | End: 2020-09-02

## 2020-06-22 RX ORDER — ALBUTEROL SULFATE 2.5 MG/3ML
SOLUTION RESPIRATORY (INHALATION)
Qty: 75 ML | Refills: 1 | Status: SHIPPED | OUTPATIENT
Start: 2020-06-22 | End: 2020-11-06

## 2020-09-02 RX ORDER — ALBUTEROL SULFATE 90 UG/1
AEROSOL, METERED RESPIRATORY (INHALATION)
Qty: 18 G | Refills: 3 | Status: SHIPPED | OUTPATIENT
Start: 2020-09-02 | End: 2020-11-17

## 2020-11-06 RX ORDER — ALBUTEROL SULFATE 2.5 MG/3ML
SOLUTION RESPIRATORY (INHALATION)
Qty: 75 ML | Refills: 1 | Status: SHIPPED | OUTPATIENT
Start: 2020-11-06 | End: 2021-03-30

## 2020-11-17 RX ORDER — ALBUTEROL SULFATE 90 UG/1
AEROSOL, METERED RESPIRATORY (INHALATION)
Qty: 18 G | Refills: 3 | Status: SHIPPED | OUTPATIENT
Start: 2020-11-17 | End: 2020-11-24 | Stop reason: SDUPTHER

## 2020-11-24 ENCOUNTER — OFFICE VISIT (OUTPATIENT)
Dept: FAMILY MEDICINE CLINIC | Age: 45
End: 2020-11-24
Payer: COMMERCIAL

## 2020-11-24 VITALS
RESPIRATION RATE: 20 BRPM | WEIGHT: 315 LBS | TEMPERATURE: 96.6 F | DIASTOLIC BLOOD PRESSURE: 88 MMHG | SYSTOLIC BLOOD PRESSURE: 128 MMHG | HEART RATE: 91 BPM | OXYGEN SATURATION: 96 % | BODY MASS INDEX: 52.25 KG/M2

## 2020-11-24 PROCEDURE — 99214 OFFICE O/P EST MOD 30 MIN: CPT | Performed by: NURSE PRACTITIONER

## 2020-11-24 PROCEDURE — 3051F HG A1C>EQUAL 7.0%<8.0%: CPT | Performed by: NURSE PRACTITIONER

## 2020-11-24 RX ORDER — ALBUTEROL SULFATE 90 UG/1
AEROSOL, METERED RESPIRATORY (INHALATION)
Qty: 2 INHALER | Refills: 11 | Status: SHIPPED | OUTPATIENT
Start: 2020-11-24 | End: 2021-01-20 | Stop reason: SDUPTHER

## 2020-11-24 ASSESSMENT — ENCOUNTER SYMPTOMS
RESPIRATORY NEGATIVE: 1
GASTROINTESTINAL NEGATIVE: 1
EYES NEGATIVE: 1

## 2020-11-24 NOTE — PROGRESS NOTES
Essie Coronado is a 50 y.o. male whopresents today for :  Chief Complaint   Patient presents with    Other     discuss testosterone pellet therapy       HPI:     HPI  Pt here to discuss if he is a candidate for testosterone pellet therapy. Reports that his brother recently was started on it and feels much better.   Pt reports he feels tired, ED issues fatigue and problems with focus       Patient Active Problem List   Diagnosis    Fibromyalgia syndrome    Tourette syndrome    Asthma    Restless legs syndrome (RLS)    Irritable bowel syndrome    Arthritis- multiple joints-[facility]    Chest pain, atypical -Noncardiac    Abnormal nuclear cardiac imaging test-borderline for ischemia in the anterior wall-with negative cath    Tobacco dependence    Morbid obesity ([address]    S/P cardiac cath- may 2012- minimal nonobstructive CAD- prox and ostial LAD    Cardiomyopathy- nonischemic-EF 40% by echo and cath 52% by [facility]    Hypogonadism male    COPD (chronic obstructive pulmonary disease) (HCC)    MTHFR mutation (methylenetetrahydrofolate reductase) ([facility]    Bipolar 1 disorder ([address]    Schizophrenia ([address]    Obesity due to excess calories    Type 2 diabetes mellitus without complication, without long-term current use of insulin ([facility])    Adrenal adenoma        Past Medical History:   Diagnosis Date    Asthma     moderate severity    Bipolar disorder ([address]     Chicken pox     Fibromyalgia     tick disorder    Hemorrhoids     Irritable bowel syndrome     Migraine     MTHFR (methylene THF reductase) deficiency and homocystinuria ([facility])     Obesity     Obstructive sleep apnea     Parkinson's syndrome ([facility])     Peripheral neuropathy     Schizophrenia, schizo-affective ([address]     Tourette syndrome     Vitamin D deficiency     low Vitamin D      Past Surgical History:   Procedure Laterality Date    CARDIAC CATHETERIZATION  5-11-12    Minimal nonobstructive CAD of ostial and proximal LAD around 15% stenosis, otherwise other coronary arteries are widely patent. Moderate to markedly reduced LV systolic function, EF 23-88%.  CARDIOVASCULAR STRESS TEST  4-27-12    Sinus rhythm w/ nonspecific ST-T changes. During exercise and in recovery phase, no significant ST-T changes noted. Patient's resting heart rate 113 bpm, during exercise,  bpm which is 94% of maximum predicted HR. Resting /85 mmHg. During exercise, BP went up to 188/70 mmHg which is adequate BP response. Questionable mild anterior wall ischemia. EF 52%, TID ratio 1.0.     CARPAL TUNNEL RELEASE Right 2003    r hand    TESTICLE TORSION REDUCTION  age 9    TRANSTHORACIC ECHOCARDIOGRAM  5-11-12    LV size normal, systolic function moderately reduced, EF 40%. Moderate diffuse hypokinesis. LA mildly dilated.       Family History   Problem Relation Age of Onset    Arthritis Mother     High Blood Pressure Father     High Cholesterol Father     Thyroid Disease Brother     Schizophrenia Paternal Uncle     Diabetes Other         grandmother    Mental Illness Other         uncle    Heart Disease Other         grandfather, father had stroke     Social History     Tobacco Use    Smoking status: Current Every Day Smoker     Packs/day: 1.50     Years: 20.00     Pack years: 30.00     Types: Cigarettes    Smokeless tobacco: Never Used   Substance Use Topics    Alcohol use: No     Alcohol/week: 0.0 standard drinks      Current Outpatient Medications   Medication Sig Dispense Refill    albuterol sulfate  (90 Base) MCG/ACT inhaler inhale 2 puffs by mouth and INTO THE LUNGS every 6 hours if needed for wheezing 2 Inhaler 11    albuterol (PROVENTIL) (2.5 MG/3ML) 0.083% nebulizer solution inhale contents of 1 vial in nebulizer every 6 hours if needed 75 mL 1    mometasone-formoterol (DULERA) 200-5 MCG/ACT inhaler inhale 2 puffs by mouth and INTO THE LUNGS every 12 hours 13 g 5    tiotropium (SPIRIVA RESPIMAT) Objective:     Vitals:    11/24/20 1004   BP: 128/88   Site: Left Upper Arm   Position: Sitting   Cuff Size: Large Adult   Pulse: 91   Resp: 20   Temp: 96.6 °F (35.9 °C)   TempSrc: Temporal   SpO2: 96%   Weight: (!) 365 lb (165.6 kg)       Physical Exam  Constitutional:       Appearance: He is well-developed. Comments: Pt is morbidly obese. Recurrent twitching of shoulders and face consistent with tourettes    HENT:      Head: Normocephalic. Right Ear: Tympanic membrane and external ear normal.      Left Ear: Tympanic membrane and external ear normal.      Nose: Nose normal.   Neck:      Musculoskeletal: Normal range of motion and neck supple. Cardiovascular:      Rate and Rhythm: Normal rate and regular rhythm. Heart sounds: Normal heart sounds. No murmur. No friction rub. No gallop. Pulmonary:      Effort: Pulmonary effort is normal.      Breath sounds: Wheezing present. No rales. Abdominal:      General: Bowel sounds are normal.      Palpations: Abdomen is soft. Tenderness: There is no abdominal tenderness. There is no guarding. Musculoskeletal: Normal range of motion. Lymphadenopathy:      Cervical: No cervical adenopathy. Skin:     General: Skin is warm. Neurological:      Mental Status: He is alert and oriented to person, place, and time. Deep Tendon Reflexes: Reflexes are normal and symmetric. Assessment:      Diagnosis Orders   1. Type 2 diabetes mellitus without complication, without long-term current use of insulin ([facility])  CBC Auto Differential    Comprehensive Metabolic Panel    Lipid Panel    Hemoglobin A1C   2. Hypogonadism male  Testosterone   3. Vitamin D deficiency  Vitamin D 25 Hydroxy   4. Screening for prostate cancer  PSA Screening   5. Need for vaccination     6. Chronic obstructive pulmonary disease, unspecified COPD type ([facility])  albuterol sulfate  (90 Base) MCG/ACT inhaler       Plan:      No follow-ups on file.        Orders Placed This Encounter   Procedures    CBC Auto Differential     Standing Status:   Future     Standing Expiration Date:   11/24/2021    Comprehensive Metabolic Panel     Standing Status:   Future     Standing Expiration Date:   11/24/2021    Testosterone     Standing Status:   Future     Standing Expiration Date:   11/24/2021    PSA Screening     Standing Status:   Future     Standing Expiration Date:   11/24/2021    Lipid Panel     Standing Status:   Future     Standing Expiration Date:   11/24/2021     Order Specific Question:   Is Patient Fasting?/# of Hours     Answer:   12    Hemoglobin A1C     Standing Status:   Future     Standing Expiration Date:   11/24/2021    Vitamin D 25 Hydroxy     Standing Status:   Future     Standing Expiration Date:   11/24/2021     Orders Placed This Encounter   Medications    albuterol sulfate  (90 Base) MCG/ACT inhaler     Sig: inhale 2 puffs by mouth and INTO THE LUNGS every 6 hours if needed for wheezing     Dispense:  2 Inhaler     Refill:  11      Will check labs. Pt is a diabetic  Elevated sugar may also be a cause. Depending labs may refer to endo     Patient given educational materials - seepatient instructions. Discussed use, benefit, and side effects of prescribed medications. All patient questions answered. Pt voiced understanding. Patient agreed withtreatment plan. Follow up as directed.      Electronically signed by CARMEN Dobbs CNP on 11/24/2020 at 5:07 PM

## 2021-01-20 ENCOUNTER — PATIENT MESSAGE (OUTPATIENT)
Dept: FAMILY MEDICINE CLINIC | Age: 46
End: 2021-01-20

## 2021-01-20 DIAGNOSIS — J44.9 CHRONIC OBSTRUCTIVE PULMONARY DISEASE, UNSPECIFIED COPD TYPE (HCC): ICD-10-CM

## 2021-01-20 RX ORDER — ALBUTEROL SULFATE 90 UG/1
AEROSOL, METERED RESPIRATORY (INHALATION)
Qty: 2 INHALER | Refills: 11 | Status: SHIPPED | OUTPATIENT
Start: 2021-01-20 | End: 2022-01-27

## 2021-01-20 NOTE — TELEPHONE ENCOUNTER
From: Carlene Berg  To: Felice Salgado APRN - CNP  Sent: 1/20/2021 1:07 PM EST  Subject: Prescription Question    Good afternoon Liu. My wifes company has switched insurance companies, which I have already updated on my chart, for the practice there. But Im having some troubles with [facility] in [place], they are terribly confused. Anyhow, I need for you to send a pre authorization for spiriva, and also to change my prescription for the   Albuterol from one refill a month, to twice a month, as per our discussion during my last visit.  Thank you

## 2021-02-11 ENCOUNTER — TELEPHONE (OUTPATIENT)
Dept: FAMILY MEDICINE CLINIC | Age: 46
End: 2021-02-11

## 2021-02-11 DIAGNOSIS — R09.81 NASAL CONGESTION: Primary | ICD-10-CM

## 2021-02-11 NOTE — TELEPHONE ENCOUNTER
Pt states started 2/7 with runny nose, congestion, ST, diarrhea and headaches.   Denies having cough, fever, vomit, loss t/s, chills    Pt requesting covid order    Placed order for covid, flu and strep    Faxed to [facility] [phone]

## 2021-03-01 RX ORDER — ERGOCALCIFEROL 1.25 MG/1
CAPSULE ORAL
Qty: 8 CAPSULE | Refills: 5 | Status: SHIPPED | OUTPATIENT
Start: 2021-03-01 | End: 2022-02-08

## 2021-03-02 ENCOUNTER — PATIENT MESSAGE (OUTPATIENT)
Dept: FAMILY MEDICINE CLINIC | Age: 46
End: 2021-03-02

## 2021-03-02 NOTE — TELEPHONE ENCOUNTER
From: Aisha Goodwin  To: CARMEN Aguirre - CNP  Sent: 3/2/2021 12:00 PM EST  Subject: Prescription Question    Hi Liu  My insurance absolutely will not fill spiriva, so I did some digging and found atrovent hfa. Insurance will cover it. Its in the form of a typical inhaler. I was hoping you could prescribe this one to me instead.

## 2021-03-30 RX ORDER — ALBUTEROL SULFATE 2.5 MG/3ML
SOLUTION RESPIRATORY (INHALATION)
Qty: 75 ML | Refills: 1 | Status: SHIPPED | OUTPATIENT
Start: 2021-03-30 | End: 2021-09-09

## 2021-05-10 DIAGNOSIS — H60.543 ECZEMA OF BOTH EXTERNAL EARS: ICD-10-CM

## 2021-05-11 RX ORDER — FLUOCINOLONE ACETONIDE 0.1 MG/ML
SOLUTION TOPICAL
Qty: 60 ML | Refills: 2 | Status: SHIPPED | OUTPATIENT
Start: 2021-05-11

## 2021-05-11 NOTE — TELEPHONE ENCOUNTER
Patient's last appointment was : 11/24/2020  Patient's next appointment is : Visit date not found  Last refilled:  2/12/2020

## 2021-07-08 DIAGNOSIS — J44.9 CHRONIC OBSTRUCTIVE PULMONARY DISEASE, UNSPECIFIED COPD TYPE (HCC): ICD-10-CM

## 2021-07-08 RX ORDER — IPRATROPIUM BROMIDE 17 UG/1
AEROSOL, METERED RESPIRATORY (INHALATION)
Qty: 12.9 G | Refills: 2 | Status: SHIPPED | OUTPATIENT
Start: 2021-07-08 | End: 2021-11-15

## 2021-09-09 RX ORDER — ALBUTEROL SULFATE 2.5 MG/3ML
SOLUTION RESPIRATORY (INHALATION)
Qty: 75 ML | Refills: 1 | Status: SHIPPED | OUTPATIENT
Start: 2021-09-09

## 2021-10-01 ENCOUNTER — OFFICE VISIT (OUTPATIENT)
Dept: FAMILY MEDICINE CLINIC | Age: 46
End: 2021-10-01
Payer: COMMERCIAL

## 2021-10-01 VITALS
OXYGEN SATURATION: 96 % | HEART RATE: 91 BPM | SYSTOLIC BLOOD PRESSURE: 136 MMHG | TEMPERATURE: 97 F | HEIGHT: 70 IN | WEIGHT: 315 LBS | BODY MASS INDEX: 45.1 KG/M2 | DIASTOLIC BLOOD PRESSURE: 80 MMHG | RESPIRATION RATE: 18 BRPM

## 2021-10-01 DIAGNOSIS — E29.1 HYPOGONADISM MALE: ICD-10-CM

## 2021-10-01 DIAGNOSIS — E11.9 TYPE 2 DIABETES MELLITUS WITHOUT COMPLICATION, WITHOUT LONG-TERM CURRENT USE OF INSULIN (HCC): ICD-10-CM

## 2021-10-01 DIAGNOSIS — Z12.5 SCREENING FOR PROSTATE CANCER: ICD-10-CM

## 2021-10-01 DIAGNOSIS — J44.9 CHRONIC OBSTRUCTIVE PULMONARY DISEASE, UNSPECIFIED COPD TYPE (HCC): Primary | ICD-10-CM

## 2021-10-01 DIAGNOSIS — E55.9 VITAMIN D DEFICIENCY: ICD-10-CM

## 2021-10-01 LAB
ALBUMIN SERPL-MCNC: 4.3 G/DL (ref 3.5–5.1)
ALP BLD-CCNC: 96 U/L (ref 38–126)
ALT SERPL-CCNC: 24 U/L (ref 11–66)
ANION GAP SERPL CALCULATED.3IONS-SCNC: 12 MEQ/L (ref 8–16)
AST SERPL-CCNC: 17 U/L (ref 5–40)
AVERAGE GLUCOSE: 339 MG/DL (ref 70–126)
BASOPHILS # BLD: 0.6 %
BASOPHILS ABSOLUTE: 0.1 THOU/MM3 (ref 0–0.1)
BILIRUB SERPL-MCNC: 0.5 MG/DL (ref 0.3–1.2)
BUN BLDV-MCNC: 12 MG/DL (ref 7–22)
CALCIUM SERPL-MCNC: 9.5 MG/DL (ref 8.5–10.5)
CHLORIDE BLD-SCNC: 100 MEQ/L (ref 98–111)
CHOLESTEROL, TOTAL: 196 MG/DL (ref 100–199)
CO2: 25 MEQ/L (ref 23–33)
CREAT SERPL-MCNC: 0.6 MG/DL (ref 0.4–1.2)
EOSINOPHIL # BLD: 1 %
EOSINOPHILS ABSOLUTE: 0.1 THOU/MM3 (ref 0–0.4)
ERYTHROCYTE [DISTWIDTH] IN BLOOD BY AUTOMATED COUNT: 13.1 % (ref 11.5–14.5)
ERYTHROCYTE [DISTWIDTH] IN BLOOD BY AUTOMATED COUNT: 45.5 FL (ref 35–45)
GFR SERPL CREATININE-BSD FRML MDRD: > 90 ML/MIN/1.73M2
GLUCOSE BLD-MCNC: 304 MG/DL (ref 70–108)
HBA1C MFR BLD: 13.3 % (ref 4.4–6.4)
HCT VFR BLD CALC: 57.3 % (ref 42–52)
HDLC SERPL-MCNC: 30 MG/DL
HEMOGLOBIN: 19.4 GM/DL (ref 14–18)
IMMATURE GRANS (ABS): 0.06 THOU/MM3 (ref 0–0.07)
IMMATURE GRANULOCYTES: 0.5 %
LDL CHOLESTEROL CALCULATED: 116 MG/DL
LYMPHOCYTES # BLD: 16.2 %
LYMPHOCYTES ABSOLUTE: 2 THOU/MM3 (ref 1–4.8)
MCH RBC QN AUTO: 32.2 PG (ref 26–33)
MCHC RBC AUTO-ENTMCNC: 33.9 GM/DL (ref 32.2–35.5)
MCV RBC AUTO: 95 FL (ref 80–94)
MONOCYTES # BLD: 5.8 %
MONOCYTES ABSOLUTE: 0.7 THOU/MM3 (ref 0.4–1.3)
NUCLEATED RED BLOOD CELLS: 0 /100 WBC
PLATELET # BLD: 229 THOU/MM3 (ref 130–400)
PMV BLD AUTO: 9.7 FL (ref 9.4–12.4)
POTASSIUM SERPL-SCNC: 4.6 MEQ/L (ref 3.5–5.2)
PROSTATE SPECIFIC ANTIGEN: 0.49 NG/ML (ref 0–1)
RBC # BLD: 6.03 MILL/MM3 (ref 4.7–6.1)
SEG NEUTROPHILS: 75.9 %
SEGMENTED NEUTROPHILS ABSOLUTE COUNT: 9.5 THOU/MM3 (ref 1.8–7.7)
SODIUM BLD-SCNC: 137 MEQ/L (ref 135–145)
TOTAL PROTEIN: 7.6 G/DL (ref 6.1–8)
TRIGL SERPL-MCNC: 249 MG/DL (ref 0–199)
TSH SERPL DL<=0.05 MIU/L-ACNC: 2.57 UIU/ML (ref 0.4–4.2)
VITAMIN D 25-HYDROXY: 16 NG/ML (ref 30–100)
WBC # BLD: 12.5 THOU/MM3 (ref 4.8–10.8)

## 2021-10-01 PROCEDURE — 99214 OFFICE O/P EST MOD 30 MIN: CPT | Performed by: NURSE PRACTITIONER

## 2021-10-01 PROCEDURE — 36415 COLL VENOUS BLD VENIPUNCTURE: CPT | Performed by: NURSE PRACTITIONER

## 2021-10-01 RX ORDER — VARENICLINE TARTRATE 1 MG/1
1 TABLET, FILM COATED ORAL 2 TIMES DAILY
Qty: 60 TABLET | Refills: 3 | Status: SHIPPED | OUTPATIENT
Start: 2021-10-01 | End: 2022-06-28

## 2021-10-01 RX ORDER — VARENICLINE TARTRATE
KIT
Qty: 1 BOX | Refills: 0 | Status: SHIPPED | OUTPATIENT
Start: 2021-10-01 | End: 2022-06-28

## 2021-10-01 SDOH — ECONOMIC STABILITY: FOOD INSECURITY: WITHIN THE PAST 12 MONTHS, YOU WORRIED THAT YOUR FOOD WOULD RUN OUT BEFORE YOU GOT MONEY TO BUY MORE.: NEVER TRUE

## 2021-10-01 SDOH — ECONOMIC STABILITY: FOOD INSECURITY: WITHIN THE PAST 12 MONTHS, THE FOOD YOU BOUGHT JUST DIDN'T LAST AND YOU DIDN'T HAVE MONEY TO GET MORE.: NEVER TRUE

## 2021-10-01 ASSESSMENT — ENCOUNTER SYMPTOMS
EYES NEGATIVE: 1
COUGH: 1
WHEEZING: 1
GASTROINTESTINAL NEGATIVE: 1

## 2021-10-01 ASSESSMENT — SOCIAL DETERMINANTS OF HEALTH (SDOH): HOW HARD IS IT FOR YOU TO PAY FOR THE VERY BASICS LIKE FOOD, HOUSING, MEDICAL CARE, AND HEATING?: NOT HARD AT ALL

## 2021-10-01 NOTE — PROGRESS NOTES
nonobstructive CAD of ostial and proximal LAD around 15% stenosis, otherwise other coronary arteries are widely patent. Moderate to markedly reduced LV systolic function, EF 82-12%.  CARDIOVASCULAR STRESS TEST  4-27-12    Sinus rhythm w/ nonspecific ST-T changes. During exercise and in recovery phase, no significant ST-T changes noted. Patient's resting heart rate 113 bpm, during exercise,  bpm which is 94% of maximum predicted HR. Resting /85 mmHg. During exercise, BP went up to 188/70 mmHg which is adequate BP response. Questionable mild anterior wall ischemia. EF 52%, TID ratio 1.0.     CARPAL TUNNEL RELEASE Right 2003    r hand    TESTICLE TORSION REDUCTION  age 9    TRANSTHORACIC ECHOCARDIOGRAM  5-11-12    LV size normal, systolic function moderately reduced, EF 40%. Moderate diffuse hypokinesis. LA mildly dilated. Family History   Problem Relation Age of Onset    Arthritis Mother     High Blood Pressure Father     High Cholesterol Father     Thyroid Disease Brother     Schizophrenia Paternal Uncle     Diabetes Other         grandmother    Mental Illness Other         uncle    Heart Disease Other         grandfather, father had stroke     Social History     Tobacco Use    Smoking status: Current Every Day Smoker     Packs/day: 1.50     Years: 20.00     Pack years: 30.00     Types: Cigarettes    Smokeless tobacco: Never Used   Substance Use Topics    Alcohol use: No     Alcohol/week: 0.0 standard drinks      Current Outpatient Medications   Medication Sig Dispense Refill    varenicline (CHANTIX STARTING MONTH APRIL) 0.5 MG X 11 & 1 MG X 42 tablet Take by mouth as directed in starting month pack.  1 box 0    varenicline (CHANTIX) 1 MG tablet Take 1 tablet by mouth 2 times daily 60 tablet 3    albuterol (PROVENTIL) (2.5 MG/3ML) 0.083% nebulizer solution inhale contents of 1 vial in nebulizer every 6 hours if needed 75 mL 1    ATROVENT HFA 17 MCG/ACT inhaler inhale 2 puffs by mouth and INTO THE LUNGS three times a day 12.9 g 2    mometasone-formoterol (DULERA) 200-5 MCG/ACT inhaler inhale 2 puffs by mouth and INTO THE LUNGS every 12 hours 13 g 5    fluocinolone acetonide (SYNALAR) 0.01 % external solution apply to affected area twice a day 60 mL 2    vitamin D (ERGOCALCIFEROL) 1.25 MG (93132 UT) CAPS capsule take 1 capsule by mouth TWO TIMES PER WEEK 8 capsule 5    albuterol sulfate  (90 Base) MCG/ACT inhaler inhale 2 puffs by mouth and INTO THE LUNGS every 6 hours if needed for wheezing 2 Inhaler 11    Respiratory Therapy Supplies ISAIAH cpap and supplies, dx GLORIA 1 Device 0    Nebulizers (COMPRESSOR/NEBULIZER) MISC Nebulizer with tubing dx asthma 1 each 3    glucose monitoring kit (FREESTYLE) monitoring kit Dispense glucometer with test strips and lancets. #100, check BS daily (Patient not taking: Reported on 10/1/2021) 1 kit 3    NITROSTAT 0.4 MG SL tablet place 1 tablet under the tongue if needed every 5 minutes for chest pain for 3 doses IF NO RELIEF AFTER 3RD DOSE CALL PRESCRIBER . (Patient not taking: Reported on 10/1/2021) 25 tablet 0    EPINEPHrine (EPIPEN) 0.3 MG/0.3ML ISAIAH injection Inject 0.3 mLs into the muscle once as needed for 1 dose. (Patient not taking: Reported on 10/1/2021) 1 Device 3     No current facility-administered medications for this visit.      Allergies   Allergen Reactions    Allopurinol      Sore throat      Olanzapine     Quetiapine     Seroquel [Quetiapine Fumarate]      Health Maintenance   Topic Date Due    HIV screen  Never done    Hepatitis B vaccine (1 of 3 - Risk 3-dose series) Never done    DTaP/Tdap/Td vaccine (1 - Tdap) Never done    Diabetic retinal exam  05/04/2019    Diabetic foot exam  02/12/2021    A1C test (Diabetic or Prediabetic)  02/12/2021    Diabetic microalbuminuria test  02/12/2021    Lipid screen  02/12/2021    Flu vaccine (1) 09/01/2021    Colon cancer screen colonoscopy  12/21/2021    Pneumococcal 0-64 years Vaccine (2 of 2 - PPSV23) 04/18/2038    COVID-19 Vaccine  Completed    Hepatitis C screen  Completed    Hepatitis A vaccine  Aged Out    Hib vaccine  Aged Out    Meningococcal (ACWY) vaccine  Aged Out       Subjective:     Review of Systems   Constitutional: Positive for fatigue. HENT: Negative. Eyes: Negative. Respiratory: Positive for cough and wheezing. Cardiovascular: Negative. Gastrointestinal: Negative. Musculoskeletal: Negative. Skin: Negative. Neurological: Negative. Objective:     Vitals:    10/01/21 0815   BP: 136/80   Site: Left Lower Arm   Position: Sitting   Cuff Size: Medium Adult   Pulse: 91   Resp: 18   Temp: 97 °F (36.1 °C)   TempSrc: Temporal   SpO2: 96%   Weight: (!) 358 lb 9.6 oz (162.7 kg)   Height: 5' 10.08\" (1.78 m)       Physical Exam  Constitutional:       Appearance: He is well-developed. HENT:      Head: Normocephalic. Right Ear: Tympanic membrane and external ear normal.      Left Ear: Tympanic membrane and external ear normal.      Nose: Nose normal.   Cardiovascular:      Rate and Rhythm: Normal rate and regular rhythm. Heart sounds: Normal heart sounds. No murmur heard. No friction rub. No gallop. Pulmonary:      Effort: Pulmonary effort is normal.      Breath sounds: Wheezing present. No rales. Abdominal:      General: Bowel sounds are normal.      Palpations: Abdomen is soft. Tenderness: There is no abdominal tenderness. There is no guarding. Musculoskeletal:         General: Normal range of motion. Cervical back: Normal range of motion and neck supple. Lymphadenopathy:      Cervical: No cervical adenopathy. Skin:     General: Skin is warm. Neurological:      Mental Status: He is alert and oriented to person, place, and time. Deep Tendon Reflexes: Reflexes are normal and symmetric. Assessment:      Diagnosis Orders   1.  Chronic obstructive pulmonary disease, unspecified COPD type ([facility]     2. Type 2 diabetes mellitus without complication, without long-term current use of insulin (HCC)  CBC Auto Differential    Comprehensive Metabolic Panel    Hemoglobin A1C    Lipid Panel    Comprehensive Metabolic Panel    CBC Auto Differential    Hemoglobin A1C    Lipid Panel   3. Hypogonadism male  Testosterone    TSH With Reflex Ft4    Testosterone    TSH With Reflex Ft4   4. Vitamin D deficiency  Vitamin D 25 Hydroxy    Vitamin D 25 Hydroxy   5. Screening for prostate cancer  PSA Screening    PSA Screening       Plan:      No follow-ups on file. Orders Placed This Encounter   Procedures    CBC Auto Differential     Standing Status:   Future     Number of Occurrences:   1     Standing Expiration Date:   10/1/2022    Comprehensive Metabolic Panel     Standing Status:   Future     Number of Occurrences:   1     Standing Expiration Date:   10/1/2022    Testosterone     Standing Status:   Future     Number of Occurrences:   1     Standing Expiration Date:   10/1/2022    PSA Screening     Standing Status:   Future     Number of Occurrences:   1     Standing Expiration Date:   10/1/2022    Hemoglobin A1C     Standing Status:   Future     Number of Occurrences:   1     Standing Expiration Date:   10/1/2022    Vitamin D 25 Hydroxy     Standing Status:   Future     Number of Occurrences:   1     Standing Expiration Date:   10/1/2022    Lipid Panel     Standing Status:   Future     Number of Occurrences:   1     Standing Expiration Date:   10/1/2022     Order Specific Question:   Is Patient Fasting?/# of Hours     Answer:   12    TSH With Reflex Ft4     Standing Status:   Future     Number of Occurrences:   1     Standing Expiration Date:   10/1/2022     Orders Placed This Encounter   Medications    varenicline (CHANTIX STARTING MONTH PAK) 0.5 MG X 11 & 1 MG X 42 tablet     Sig: Take by mouth as directed in starting month pack.      Dispense:  1 box     Refill:  0    varenicline (CHANTIX) 1 MG tablet     Sig: Take 1 tablet by mouth 2 times daily     Dispense:  60 tablet     Refill:  3      See orders  Did discuss monitoring his mental health with chantix  Will notify pt of test results when they are available. If they are not notified they are to call office for the result      Patient given educational materials - seepatient instructions. Discussed use, benefit, and side effects of prescribed medications. All patient questions answered. Pt voiced understanding. Patient agreed withtreatment plan. Follow up as directed.      Electronically signed by CARMEN Ocampo CNP on 10/1/2021 at 12:23 PM

## 2021-10-04 LAB — TESTOSTERONE TOTAL: 242 NG/DL (ref 300–890)

## 2021-10-07 ENCOUNTER — OFFICE VISIT (OUTPATIENT)
Dept: FAMILY MEDICINE CLINIC | Age: 46
End: 2021-10-07
Payer: COMMERCIAL

## 2021-10-07 VITALS
HEIGHT: 70 IN | WEIGHT: 315 LBS | OXYGEN SATURATION: 97 % | TEMPERATURE: 96.9 F | RESPIRATION RATE: 18 BRPM | DIASTOLIC BLOOD PRESSURE: 74 MMHG | HEART RATE: 81 BPM | BODY MASS INDEX: 45.1 KG/M2 | SYSTOLIC BLOOD PRESSURE: 124 MMHG

## 2021-10-07 DIAGNOSIS — E11.65 UNCONTROLLED TYPE 2 DIABETES MELLITUS WITH HYPERGLYCEMIA (HCC): Primary | ICD-10-CM

## 2021-10-07 PROCEDURE — 99214 OFFICE O/P EST MOD 30 MIN: CPT | Performed by: NURSE PRACTITIONER

## 2021-10-07 RX ORDER — FLASH GLUCOSE SCANNING READER
EACH MISCELLANEOUS
Qty: 4 EACH | Refills: 2 | Status: SHIPPED | OUTPATIENT
Start: 2021-10-07

## 2021-10-07 RX ORDER — FLASH GLUCOSE SENSOR
KIT MISCELLANEOUS
Qty: 1 EACH | Refills: 0 | Status: SHIPPED | OUTPATIENT
Start: 2021-10-07

## 2021-10-07 RX ORDER — FLASH GLUCOSE SENSOR
KIT MISCELLANEOUS
Qty: 4 EACH | Refills: 5 | Status: SHIPPED | OUTPATIENT
Start: 2021-10-07

## 2021-10-07 ASSESSMENT — ENCOUNTER SYMPTOMS
EYES NEGATIVE: 1
GASTROINTESTINAL NEGATIVE: 1
RESPIRATORY NEGATIVE: 1

## 2021-10-07 NOTE — PROGRESS NOTES
Clif Nicholas is a 48 y.o. male whopresents today for :  Chief Complaint   Patient presents with    Other     discuss tx for high sugar       HPI:     HPI  Pt here for fu of his labs. He was just in and was feeling tired. Pt thought was his low t but labs revealed severely high sugar.   He has been urinating a great deal and feeling tired      Patient Active Problem List   Diagnosis    Fibromyalgia syndrome    Tourette syndrome    Asthma    Restless legs syndrome (RLS)    Irritable bowel syndrome    Arthritis- multiple joints-under work up for it by [facility]    Chest pain, atypical -Noncardiac    Abnormal nuclear cardiac imaging test-borderline for ischemia in the anterior wall-with negative cath    Tobacco dependence    Morbid obesity ([address]    S/P cardiac cath- may 2012- minimal nonobstructive CAD- prox and ostial LAD    Cardiomyopathy- nonischemic-EF 40% by echo and cath 52% by [facility]    Hypogonadism male    COPD (chronic obstructive pulmonary disease) (HCC)    MTHFR mutation (methylenetetrahydrofolate reductase)    Bipolar 1 disorder ([address]    Schizophrenia ([address]    Obesity due to excess calories    Type 2 diabetes mellitus without complication, without long-term current use of insulin ([facility])    Adrenal adenoma        Past Medical History:   Diagnosis Date    Asthma     moderate severity    Bipolar disorder ([address]     Chicken pox     Fibromyalgia     tick disorder    Hemorrhoids     Irritable bowel syndrome     Migraine     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 Moderate to markedly reduced LV systolic function, EF 13-23%.  CARDIOVASCULAR STRESS TEST  4-27-12    Sinus rhythm w/ nonspecific ST-T changes. During exercise and in recovery phase, no significant ST-T changes noted. Patient's resting heart rate 113 bpm, during exercise,  bpm which is 94% of maximum predicted HR. Resting /85 mmHg. During exercise, BP went up to 188/70 mmHg which is adequate BP response. Questionable mild anterior wall ischemia. EF 52%, TID ratio 1.0.     CARPAL TUNNEL RELEASE Right 2003    r hand    TESTICLE TORSION REDUCTION  age 9    TRANSTHORACIC ECHOCARDIOGRAM  5-11-12    LV size normal, systolic function moderately reduced, EF 40%. Moderate diffuse hypokinesis. LA mildly dilated. Family History   Problem Relation Age of Onset    Arthritis Mother     High Blood Pressure Father     High Cholesterol Father     Thyroid Disease Brother     Schizophrenia Paternal Uncle     Diabetes Other         grandmother    Mental Illness Other         uncle    Heart Disease Other         grandfather, father had stroke     Social History     Tobacco Use    Smoking status: Current Every Day Smoker     Packs/day: 1.50     Years: 20.00     Pack years: 30.00     Types: Cigarettes    Smokeless tobacco: Never Used   Substance Use Topics    Alcohol use: No     Alcohol/week: 0.0 standard drinks      Current Outpatient Medications   Medication Sig Dispense Refill    dapagliflozin (FARXIGA) 5 MG tablet Take 1 tablet by mouth every morning 30 tablet 5    metFORMIN (GLUCOPHAGE) 500 MG tablet Take 1 tablet by mouth 2 times daily (with meals) 60 tablet 5    Continuous Blood Gluc Sensor (FREESTYLE ARIEL 14 DAY SENSOR) [facility] system as directed. Uncontrolled diabetes 4 each 5    Continuous Blood Gluc  (FREESTYLE ARIEL 14 DAY READER) ISAIAH As directed.   Dx uncontrolled diabetes 4 each 2    Continuous Blood Gluc Sensor (FREESTYLE ARIEL SENSOR SYSTEM) MISC As directed 1 each 0    varenicline (CHANTIX STARTING MONTH APRIL) 0.5 MG X 11 & 1 MG X 42 tablet Take by mouth as directed in starting month pack. 1 box 0    varenicline (CHANTIX) 1 MG tablet Take 1 tablet by mouth 2 times daily 60 tablet 3    albuterol (PROVENTIL) (2.5 MG/3ML) 0.083% nebulizer solution inhale contents of 1 vial in nebulizer every 6 hours if needed 75 mL 1    ATROVENT HFA 17 MCG/ACT inhaler inhale 2 puffs by mouth and INTO THE LUNGS three times a day 12.9 g 2    mometasone-formoterol (DULERA) 200-5 MCG/ACT inhaler inhale 2 puffs by mouth and INTO THE LUNGS every 12 hours 13 g 5    fluocinolone acetonide (SYNALAR) 0.01 % external solution apply to affected area twice a day 60 mL 2    vitamin D (ERGOCALCIFEROL) 1.25 MG (35459 UT) CAPS capsule take 1 capsule by mouth TWO TIMES PER WEEK 8 capsule 5    albuterol sulfate  (90 Base) MCG/ACT inhaler inhale 2 puffs by mouth and INTO THE LUNGS every 6 hours if needed for wheezing 2 Inhaler 11    glucose monitoring kit (FREESTYLE) monitoring kit Dispense glucometer with test strips and lancets. #100, check BS daily 1 kit 3    Respiratory Therapy Supplies ISAIAH cpap and supplies, dx GLORIA 1 Device 0    Nebulizers (COMPRESSOR/NEBULIZER) MISC Nebulizer with tubing dx asthma 1 each 3    NITROSTAT 0.4 MG SL tablet place 1 tablet under the tongue if needed every 5 minutes for chest pain for 3 doses IF NO RELIEF AFTER 3RD DOSE CALL PRESCRIBER . (Patient not taking: Reported on 10/1/2021) 25 tablet 0    EPINEPHrine (EPIPEN) 0.3 MG/0.3ML ISAIAH injection Inject 0.3 mLs into the muscle once as needed for 1 dose. (Patient not taking: Reported on 10/1/2021) 1 Device 3     No current facility-administered medications for this visit.      Allergies   Allergen Reactions    Allopurinol      Sore throat      Olanzapine     Quetiapine     Seroquel [Quetiapine Fumarate]      Health Maintenance   Topic Date Due    HIV screen  Never done    Hepatitis B vaccine (1 of 3 - Risk 3-dose series) Never done    DTaP/Tdap/Td vaccine (1 - Tdap) Never done    Diabetic retinal exam  05/04/2019    Diabetic foot exam  02/12/2021    Diabetic microalbuminuria test  02/12/2021    Flu vaccine (1) 09/01/2021    Colon cancer screen colonoscopy  12/21/2021    A1C test (Diabetic or Prediabetic)  01/01/2022    Lipid screen  10/01/2022    Pneumococcal 0-64 years Vaccine (2 of 2 - PPSV23) 04/18/2038    COVID-19 Vaccine  Completed    Hepatitis C screen  Completed    Hepatitis A vaccine  Aged Out    Hib vaccine  Aged Out    Meningococcal (ACWY) vaccine  Aged Out       Subjective:     Review of Systems   Constitutional: Positive for fatigue. HENT: Negative. Eyes: Negative. Respiratory: Negative. Cardiovascular: Negative. Gastrointestinal: Negative. Genitourinary: Positive for frequency. Musculoskeletal: Negative. Skin: Negative. Neurological: Negative. Objective:     Vitals:    10/07/21 1003   BP: 124/74   Site: Left Upper Arm   Position: Sitting   Cuff Size: Large Adult   Pulse: 81   Resp: 18   Temp: 96.9 °F (36.1 °C)   TempSrc: Temporal   SpO2: 97%   Weight: (!) 362 lb 3.2 oz (164.3 kg)   Height: 5' 10.08\" (1.78 m)       Physical Exam  Constitutional:       Appearance: He is well-developed. HENT:      Head: Normocephalic. Right Ear: Tympanic membrane and external ear normal.      Left Ear: Tympanic membrane and external ear normal.      Nose: Nose normal.   Cardiovascular:      Rate and Rhythm: Normal rate and regular rhythm. Heart sounds: Normal heart sounds. No murmur heard. No friction rub. No gallop. Pulmonary:      Effort: Pulmonary effort is normal.      Breath sounds: Normal breath sounds. No wheezing or rales. Abdominal:      General: Bowel sounds are normal.      Palpations: Abdomen is soft. Tenderness: There is no abdominal tenderness. There is no guarding.    Musculoskeletal: General: Normal range of motion. Cervical back: Normal range of motion and neck supple. Lymphadenopathy:      Cervical: No cervical adenopathy. Skin:     General: Skin is warm. Neurological:      Mental Status: He is alert and oriented to person, place, and time. Deep Tendon Reflexes: Reflexes are normal and symmetric. Assessment:      Diagnosis Orders   1. Uncontrolled type 2 diabetes mellitus with hyperglycemia (HCC)  dapagliflozin (FARXIGA) 5 MG tablet    metFORMIN (GLUCOPHAGE) 500 MG tablet    Continuous Blood Gluc Sensor (FREESTYLE ARIEL 14 DAY SENSOR) MISC    Continuous Blood Gluc  (FREESTYLE ARIEL 14 DAY READER) ISAIAH    Continuous Blood Gluc Sensor ([address] [place] [address]) [facility]       Plan:      Return in about 4 weeks (around 2021) for diabetes. No orders of the defined types were placed in this encounter. Orders Placed This Encounter   Medications    dapagliflozin (FARXIGA) 5 MG tablet     Sig: Take 1 tablet by mouth every morning     Dispense:  30 tablet     Refill:  5    metFORMIN (GLUCOPHAGE) 500 MG tablet     Sig: Take 1 tablet by mouth 2 times daily (with meals)     Dispense:  60 tablet     Refill:  5    Continuous Blood Gluc Sensor (FREESTYLE ARIEL 14 DAY SENSOR) [facility]     [facility] system as directed. Uncontrolled diabetes     Dispense:  4 each     Refill:  5    Continuous Blood Gluc  (FREESTYLE ARIEL 14 DAY READER) ISAIAH     Sig: As directed. Dx uncontrolled diabetes     Dispense:  4 each     Refill:  2    Continuous Blood Gluc Sensor ([address] [place] [address]) [facility]     Sig: As directed     Dispense:  1 each     Refill:  0      Discussed referral to endo but pt wished to hold for now  Will start continuous glucose monitor due to his severely high sugar  Start metformin and farxiga  Fu in 1month    Patient given educational materials - seepatient instructions. Discussed use, benefit, and side effects of prescribed medications. All patient questions answered. Pt voiced understanding. Patient agreed withtreatment plan. Follow up as directed.      Electronically signed by CARMEN Dorantes CNP on 10/7/2021 at 1:14 PM

## 2021-11-08 ENCOUNTER — OFFICE VISIT (OUTPATIENT)
Dept: FAMILY MEDICINE CLINIC | Age: 46
End: 2021-11-08
Payer: COMMERCIAL

## 2021-11-08 VITALS
DIASTOLIC BLOOD PRESSURE: 74 MMHG | TEMPERATURE: 97.6 F | RESPIRATION RATE: 14 BRPM | HEART RATE: 78 BPM | SYSTOLIC BLOOD PRESSURE: 122 MMHG

## 2021-11-08 DIAGNOSIS — E29.1 HYPOGONADISM MALE: ICD-10-CM

## 2021-11-08 DIAGNOSIS — R06.02 SOB (SHORTNESS OF BREATH): ICD-10-CM

## 2021-11-08 DIAGNOSIS — E11.65 UNCONTROLLED TYPE 2 DIABETES MELLITUS WITH HYPERGLYCEMIA (HCC): Primary | ICD-10-CM

## 2021-11-08 DIAGNOSIS — L02.214 ABSCESS, GROIN: ICD-10-CM

## 2021-11-08 DIAGNOSIS — J44.9 CHRONIC OBSTRUCTIVE PULMONARY DISEASE, UNSPECIFIED COPD TYPE (HCC): ICD-10-CM

## 2021-11-08 DIAGNOSIS — I20.8 ANGINA AT REST (HCC): ICD-10-CM

## 2021-11-08 PROCEDURE — 99214 OFFICE O/P EST MOD 30 MIN: CPT | Performed by: NURSE PRACTITIONER

## 2021-11-08 RX ORDER — SULFAMETHOXAZOLE AND TRIMETHOPRIM 800; 160 MG/1; MG/1
1 TABLET ORAL 2 TIMES DAILY
Qty: 28 TABLET | Refills: 0 | Status: SHIPPED | OUTPATIENT
Start: 2021-11-08 | End: 2021-11-22

## 2021-11-08 ASSESSMENT — ENCOUNTER SYMPTOMS
GASTROINTESTINAL NEGATIVE: 1
EYES NEGATIVE: 1
SHORTNESS OF BREATH: 1

## 2021-11-08 NOTE — PROGRESS NOTES
Hiro Hurtado is a 48 y.o. male whopresents today for :  Chief Complaint   Patient presents with    1 Month Follow-Up    Other     abscess x 1 week. testicle pain    Sweats       HPI:     HPI  Pt here for fu. Reports sugars are improving but still quite erratic but most in 100's  Some in 200s. Reports that a week ago developed a sore in his left groin and and ear infection. with his sugar  The metformin has triggered some diarrhea but is improving. Is using aspen system but had issues with it falling off. Luma boyd yet started Chiara as insurance did not cover    On another note  Pt also reports periods of chest pain. Reports when he does things that are either activity or social stress. He gets a tight pressure across his chest.  A heavy chest pain. With rest it will resolve with time.     Patient Active Problem List   Diagnosis    Fibromyalgia syndrome    Tourette syndrome    Asthma    Restless legs syndrome (RLS)    Irritable bowel syndrome    Arthritis- multiple joints-under work up for it by [facility]    Chest pain, atypical -Noncardiac    Abnormal nuclear cardiac imaging test-borderline for ischemia in the anterior wall-with negative cath    Tobacco dependence    Morbid obesity ([address]    S/P cardiac cath- may 2012- minimal nonobstructive CAD- prox and ostial LAD    Cardiomyopathy- nonischemic-EF 40% by echo and cath 52% by Gated [facility]UC    Hypogonadism male    COPD (chronic obstructive pulmonary disease) ([address]    MTHFR mutation (methylenetetrahydrofolate reductase)    Bipolar 1 disorder ([address]    Schizophrenia ([address]    Obesity due to excess calories    Type 2 diabetes mellitus without complication, without long-term current use of insulin (HCC)    Adrenal adenoma        Past Medical History:   Diagnosis Date    Asthma     moderate severity    Bipolar disorder ([address]     Chicken pox     Fibromyalgia     tick disorder    Hemorrhoids     Irritable bowel syndrome     Migraine     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 Moderate to markedly reduced LV systolic function, EF 46-00%.  CARDIOVASCULAR STRESS TEST  4-27-12    Sinus rhythm w/ nonspecific ST-T changes. During exercise and in recovery phase, no significant ST-T changes noted. Patient's resting heart rate 113 bpm, during exercise,  bpm which is 94% of maximum predicted HR. Resting /85 mmHg. During exercise, BP went up to 188/70 mmHg which is adequate BP response. Questionable mild anterior wall ischemia. EF 52%, TID ratio 1.0.     CARPAL TUNNEL RELEASE Right 2003    r hand    TESTICLE TORSION REDUCTION  age 9    TRANSTHORACIC ECHOCARDIOGRAM  5-11-12    LV size normal, systolic function moderately reduced, EF 40%. Moderate diffuse hypokinesis. LA mildly dilated.       Family History   Problem Relation Age of Onset    Arthritis Mother     High Blood Pressure Father     High Cholesterol Father     Thyroid Disease Brother     Schizophrenia Paternal Uncle     Diabetes Other         grandmother    Mental Illness Other         uncle    Heart Disease Other         grandfather, father had stroke     Social History     Tobacco Use    Smoking status: Current Every Day Smoker     Packs/day: 1.50     Years: 20.00     Pack years: 30.00     Types: Cigarettes    Smokeless tobacco: Never Used   Substance Use Topics    Alcohol use: No     Alcohol/week: 0.0 standard drinks      Current Outpatient Medications   Medication Sig Dispense Refill    sulfamethoxazole-trimethoprim (BACTRIM DS;SEPTRA DS) 800-160 MG per tablet Take 1 tablet by mouth 2 times daily for 14 days 28 tablet 0    dapagliflozin (FARXIGA) 5 MG tablet Take 1 tablet by mouth every morning 30 tablet 5    metFORMIN (GLUCOPHAGE) 500 MG tablet Take 1 tablet by mouth 2 times daily (with meals) 60 tablet 5    Continuous Blood Gluc Sensor (FREESTYLE ARIEL 14 DAY SENSOR) [facility] system as directed. Uncontrolled diabetes 4 each 5    Continuous Blood Gluc  (FREESTYLE ARIEL 14 DAY READER) ISAIAH As directed. Dx uncontrolled diabetes 4 each 2    Continuous Blood Gluc Sensor (FREESTYLE ARIEL SENSOR SYSTEM) [facility] As directed 1 each 0    varenicline (CHANTIX STARTING MONTH APRIL) 0.5 MG X 11 & 1 MG X 42 tablet Take by mouth as directed in starting month pack. 1 box 0    varenicline (CHANTIX) 1 MG tablet Take 1 tablet by mouth 2 times daily 60 tablet 3    albuterol (PROVENTIL) (2.5 MG/3ML) 0.083% nebulizer solution inhale contents of 1 vial in nebulizer every 6 hours if needed 75 mL 1    ATROVENT HFA 17 MCG/ACT inhaler inhale 2 puffs by mouth and INTO THE LUNGS three times a day 12.9 g 2    mometasone-formoterol (DULERA) 200-5 MCG/ACT inhaler inhale 2 puffs by mouth and INTO THE LUNGS every 12 hours 13 g 5    fluocinolone acetonide (SYNALAR) 0.01 % external solution apply to affected area twice a day 60 mL 2    vitamin D (ERGOCALCIFEROL) 1.25 MG (56941 UT) CAPS capsule take 1 capsule by mouth TWO TIMES PER WEEK 8 capsule 5    albuterol sulfate  (90 Base) MCG/ACT inhaler inhale 2 puffs by mouth and INTO THE LUNGS every 6 hours if needed for wheezing 2 Inhaler 11    glucose monitoring kit (FREESTYLE) monitoring kit Dispense glucometer with test strips and lancets.   #100, check BS daily 1 kit 3    Respiratory Therapy Supplies ISAIAH cpap and supplies, dx GLORIA 1 Device 0    Nebulizers (COMPRESSOR/NEBULIZER) MISC Nebulizer with tubing dx asthma 1 each 3    NITROSTAT 0.4 MG SL tablet place 1 tablet under the tongue if needed every 5 minutes for chest pain for 3 doses IF NO RELIEF AFTER 3RD DOSE CALL PRESCRIBER . (Patient not taking: Reported on 10/1/2021) 25 tablet 0    EPINEPHrine (EPIPEN) 0.3 MG/0.3ML ISAIAH injection Inject 0.3 mLs into the muscle once as needed for 1 dose. (Patient not taking: Reported on 10/1/2021) 1 Device 3     No current facility-administered medications for this visit. Allergies   Allergen Reactions    Allopurinol      Sore throat      Olanzapine     Quetiapine     Seroquel [Quetiapine Fumarate]      Health Maintenance   Topic Date Due    HIV screen  Never done    Hepatitis B vaccine (1 of 3 - Risk 3-dose series) Never done    DTaP/Tdap/Td vaccine (1 - Tdap) Never done    Diabetic retinal exam  05/04/2019    Diabetic foot exam  02/12/2021    Diabetic microalbuminuria test  02/12/2021    Flu vaccine (1) 09/01/2021    Colon cancer screen colonoscopy  12/21/2021    A1C test (Diabetic or Prediabetic)  01/01/2022    Lipid screen  10/01/2022    Pneumococcal 0-64 years Vaccine (2 of 2 - PPSV23) 04/18/2038    COVID-19 Vaccine  Completed    Hepatitis C screen  Completed    Hepatitis A vaccine  Aged Out    Hib vaccine  Aged Out    Meningococcal (ACWY) vaccine  Aged Out       Subjective:     Review of Systems   Constitutional: Negative. HENT: Negative. Eyes: Negative. Respiratory: Positive for shortness of breath. Cardiovascular: Positive for chest pain. Gastrointestinal: Negative. Musculoskeletal: Negative. Skin: Positive for wound. Neurological: Negative. Objective:     Vitals:    11/08/21 0832   BP: 122/74   Site: Left Upper Arm   Position: Sitting   Cuff Size: Large Adult   Pulse: 78   Resp: 14   Temp: 97.6 °F (36.4 °C)   TempSrc: Temporal       Physical Exam  Constitutional:       Appearance: He is well-developed. HENT:      Head: Normocephalic.       Right Ear: Tympanic membrane and external ear normal.      Left Ear: Tympanic membrane and external ear normal.      Nose: Nose normal.   Cardiovascular:      Rate and Rhythm: Normal rate and regular rhythm. Heart sounds: Normal heart sounds. No murmur heard. No friction rub. No gallop. Pulmonary:      Effort: Pulmonary effort is normal.      Breath sounds: Normal breath sounds. No wheezing or rales. Abdominal:      General: Bowel sounds are normal.      Palpations: Abdomen is soft. Tenderness: There is no abdominal tenderness. There is no guarding. Musculoskeletal:         General: Normal range of motion. Cervical back: Normal range of motion and neck supple. Lymphadenopathy:      Cervical: No cervical adenopathy. Skin:     General: Skin is warm. Neurological:      Mental Status: He is alert and oriented to person, place, and time. Deep Tendon Reflexes: Reflexes are normal and symmetric. Assessment:      Diagnosis Orders   1. Uncontrolled type 2 diabetes mellitus with hyperglycemia ([facility]     2. Chronic obstructive pulmonary disease, unspecified COPD type ([facility]     3. Hypogonadism male     4. Angina at rest [facility])  Stress test, lexiscan   5. SOB (shortness of breath)  Stress test, lexiscan   6. Abscess, groin  sulfamethoxazole-trimethoprim (BACTRIM DS;SEPTRA DS) 800-160 MG per tablet       Plan:      Return in about 4 weeks (around 12/6/2021).        Orders Placed This Encounter   Procedures    Stress test, lexiscan     Standing Status:   Future     Standing Expiration Date:   11/8/2022     Order Specific Question:   Reason for Exam?     Answer:   Angina     Order Specific Question:   Reason for Exam?     Answer:   Shortness of breath     Orders Placed This Encounter   Medications    sulfamethoxazole-trimethoprim (BACTRIM DS;SEPTRA DS) 800-160 MG per tablet     Sig: Take 1 tablet by mouth 2 times daily for 14 days     Dispense:  28 tablet     Refill:  0    warm compresses and bactrim for sore  Discussed getting coupon card for farxiga  With his chest pain and sob  Pt is high risk. Copd, dm, obese  Will get stress test  Fu in 1month      Patient given educational materials - seepatient instructions. Discussed use, benefit, and side effects of prescribed medications. All patient questions answered. Pt voiced understanding. Patient agreed withtreatment plan. Follow up as directed.      Electronically signed by CARMEN Anderson CNP on 11/8/2021 at 12:58 PM

## 2021-11-09 ENCOUNTER — TELEPHONE (OUTPATIENT)
Dept: FAMILY MEDICINE CLINIC | Age: 46
End: 2021-11-09

## 2021-11-09 NOTE — TELEPHONE ENCOUNTER
Pt has stress test, lexiscan scheduled for 12/09/2021 at 9:00 am [address] [facility]. See appt desk for prep! L/M for patient to call office back to get testing date/time/prep.

## 2021-11-15 RX ORDER — IPRATROPIUM BROMIDE 17 UG/1
AEROSOL, METERED RESPIRATORY (INHALATION)
Qty: 12.9 G | Refills: 2 | Status: SHIPPED | OUTPATIENT
Start: 2021-11-15 | End: 2022-03-23

## 2021-12-13 ENCOUNTER — OFFICE VISIT (OUTPATIENT)
Dept: FAMILY MEDICINE CLINIC | Age: 46
End: 2021-12-13
Payer: COMMERCIAL

## 2021-12-13 VITALS
RESPIRATION RATE: 18 BRPM | HEART RATE: 94 BPM | BODY MASS INDEX: 45.1 KG/M2 | SYSTOLIC BLOOD PRESSURE: 132 MMHG | HEIGHT: 70 IN | OXYGEN SATURATION: 96 % | TEMPERATURE: 96.7 F | DIASTOLIC BLOOD PRESSURE: 68 MMHG | WEIGHT: 315 LBS

## 2021-12-13 DIAGNOSIS — E11.65 UNCONTROLLED TYPE 2 DIABETES MELLITUS WITH HYPERGLYCEMIA (HCC): Primary | ICD-10-CM

## 2021-12-13 DIAGNOSIS — Z23 NEED FOR INFLUENZA VACCINATION: ICD-10-CM

## 2021-12-13 LAB
HBA1C MFR BLD: 8.4 %
MICROALB/CREAT RATIO POC: < 30 MG/G
MICROALBUMIN/CREAT UR-RTO: 30 MG/L
POC CREATININE: 200 MG/DL

## 2021-12-13 PROCEDURE — 3052F HG A1C>EQUAL 8.0%<EQUAL 9.0%: CPT | Performed by: NURSE PRACTITIONER

## 2021-12-13 PROCEDURE — 90674 CCIIV4 VAC NO PRSV 0.5 ML IM: CPT | Performed by: NURSE PRACTITIONER

## 2021-12-13 PROCEDURE — 82044 UR ALBUMIN SEMIQUANTITATIVE: CPT | Performed by: NURSE PRACTITIONER

## 2021-12-13 PROCEDURE — 90471 IMMUNIZATION ADMIN: CPT | Performed by: NURSE PRACTITIONER

## 2021-12-13 PROCEDURE — 99214 OFFICE O/P EST MOD 30 MIN: CPT | Performed by: NURSE PRACTITIONER

## 2021-12-13 PROCEDURE — 83036 HEMOGLOBIN GLYCOSYLATED A1C: CPT | Performed by: NURSE PRACTITIONER

## 2021-12-13 ASSESSMENT — ENCOUNTER SYMPTOMS
RESPIRATORY NEGATIVE: 1
EYES NEGATIVE: 1
GASTROINTESTINAL NEGATIVE: 1

## 2021-12-13 NOTE — PROGRESS NOTES
Clif Nicholas is a 48 y.o. male whopresents today for :  Chief Complaint   Patient presents with    Diabetes     follow up       HPI:     HPI  Pt here for fu. His sugars are doing much better. Tolerating farxiga and metforming well. Pt had stopped chantix due to worsening of mental health  Pt reports abscess is healing. Results for POC orders placed in visit on 12/13/21   POCT microalbumin   Result Value Ref Range    MICROALBUMIN POC 30 mg/L    POC Creatinine 200 mg/dL    Microalb/Creat Ratio POC < 30 <30 mg/g   POCT glycosylated hemoglobin (Hb A1C)   Result Value Ref Range    Hemoglobin A1C 8.4 %     a1c is trending well.   Was almost 14 2months ago   Patient Active Problem List   Diagnosis    Fibromyalgia syndrome    Tourette syndrome    Asthma    Restless legs syndrome (RLS)    Irritable bowel syndrome    Arthritis- multiple joints-[facility]    Chest pain, atypical -Noncardiac    Abnormal nuclear cardiac imaging test-borderline for ischemia in the anterior wall-with negative cath    Tobacco dependence    Morbid obesity ([address]    S/P cardiac cath- may 2012- minimal nonobstructive CAD- prox and ostial LAD    Cardiomyopathy- nonischemic-EF 40% by echo and cath 52% by [facility]    Hypogonadism male    COPD (chronic obstructive pulmonary disease) ([address]    MTHFR mutation (methylenetetrahydrofolate reductase)    Bipolar 1 disorder ([address]    Schizophrenia ([address]    Obesity due to excess calories    Type 2 diabetes mellitus without complication, without long-term current use of insulin (HCC)    Adrenal adenoma        Past Medical History:   Diagnosis Date    Asthma     moderate severity    Bipolar disorder ([address]     Chicken pox     Fibromyalgia     tick disorder    Hemorrhoids     Irritable bowel syndrome     Migraine     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 Uncontrolled diabetes 4 each 5    Continuous Blood Gluc  (FREESTYLE ARIEL 14 DAY READER) ISAIAH As directed. Dx uncontrolled diabetes 4 each 2    Continuous Blood Gluc Sensor ([facility]) [facility] As directed 1 each 0    albuterol (PROVENTIL) (2.5 MG/3ML) 0.083% nebulizer solution inhale contents of 1 vial in nebulizer every 6 hours if needed 75 mL 1    mometasone-formoterol (DULERA) 200-5 MCG/ACT inhaler inhale 2 puffs by mouth and INTO THE LUNGS every 12 hours 13 g 5    fluocinolone acetonide (SYNALAR) 0.01 % external solution apply to affected area twice a day 60 mL 2    vitamin D (ERGOCALCIFEROL) 1.25 MG (56305 UT) CAPS capsule take 1 capsule by mouth TWO TIMES PER WEEK 8 capsule 5    albuterol sulfate  (90 Base) MCG/ACT inhaler inhale 2 puffs by mouth and INTO THE LUNGS every 6 hours if needed for wheezing 2 Inhaler 11    glucose monitoring kit (FREESTYLE) monitoring kit Dispense glucometer with test strips and lancets. #100, check BS daily 1 kit 3    Respiratory Therapy Supplies ISAIAH cpap and supplies, dx GLORIA 1 Device 0    Nebulizers (COMPRESSOR/NEBULIZER) MISC Nebulizer with tubing dx asthma 1 each 3    varenicline (CHANTIX STARTING MONTH APRIL) 0.5 MG X 11 & 1 MG X 42 tablet Take by mouth as directed in starting month pack. (Patient not taking: Reported on 12/13/2021) 1 box 0    varenicline (CHANTIX) 1 MG tablet Take 1 tablet by mouth 2 times daily (Patient not taking: Reported on 12/13/2021) 60 tablet 3    NITROSTAT 0.4 MG SL tablet place 1 tablet under the tongue if needed every 5 minutes for chest pain for 3 doses IF NO RELIEF AFTER 3RD DOSE CALL PRESCRIBER .  (Patient not taking: Reported on 10/1/2021) 25 tablet 0    EPINEPHrine (EPIPEN) 0.3 MG/0.3ML ISAIAH injection Inject 0.3 mLs into the muscle once as needed for 1 dose. (Patient not taking: Reported on 10/1/2021) 1 Device 3     No current facility-administered medications for this visit. Allergies   Allergen Reactions    Allopurinol      Sore throat      Olanzapine     Quetiapine     Seroquel [Quetiapine Fumarate]      Health Maintenance   Topic Date Due    DTaP/Tdap/Td vaccine (1 - Tdap) Never done    Diabetic retinal exam  05/04/2019    COVID-19 Vaccine (3 - Booster for Moderna series) 12/07/2021    Colon cancer screen colonoscopy  12/21/2021    Hepatitis B vaccine (1 of 3 - Risk 3-dose series) 12/13/2022 (Originally 4/18/1994)    Lipid screen  10/01/2022    Diabetic foot exam  12/13/2022    A1C test (Diabetic or Prediabetic)  12/13/2022    Diabetic microalbuminuria test  12/13/2022    Pneumococcal 0-64 years Vaccine (2 of 2 - PPSV23) 04/18/2038    Flu vaccine  Completed    Hepatitis C screen  Completed    Hepatitis A vaccine  Aged Out    Hib vaccine  Aged Out    Meningococcal (ACWY) vaccine  Aged Out    HIV screen  Discontinued       Subjective:     Review of Systems   Constitutional: Negative. HENT: Negative. Eyes: Negative. Respiratory: Negative. Cardiovascular: Negative. Gastrointestinal: Negative. Musculoskeletal: Negative. Skin: Negative. Neurological: Negative. Objective:     Vitals:    12/13/21 0824   BP: 132/68   Site: Left Upper Arm   Position: Sitting   Cuff Size: Large Adult   Pulse: 94   Resp: 18   Temp: 96.7 °F (35.9 °C)   TempSrc: Temporal   SpO2: 96%   Weight: (!) 360 lb 3.2 oz (163.4 kg)   Height: 5' 10.08\" (1.78 m)       Physical Exam  Constitutional:       Appearance: He is well-developed. HENT:      Head: Normocephalic.       Right Ear: Tympanic membrane and external ear normal.      Left Ear: Tympanic membrane and external ear normal.      Nose: Nose normal.   Cardiovascular:      Rate and Rhythm: Normal rate and regular rhythm. Heart sounds: Normal heart sounds. No murmur heard. No friction rub. No gallop. Pulmonary:      Effort: Pulmonary effort is normal.      Breath sounds: Normal breath sounds. No wheezing or rales. Abdominal:      General: Bowel sounds are normal.      Palpations: Abdomen is soft. Tenderness: There is no abdominal tenderness. There is no guarding. Musculoskeletal:         General: Normal range of motion. Cervical back: Normal range of motion and neck supple. Feet:      Right foot:      Protective Sensation: 10 sites tested. 10 sites sensed. Skin integrity: Dry skin present. Left foot:      Protective Sensation: 10 sites tested. 10 sites sensed. Skin integrity: Dry skin present. Lymphadenopathy:      Cervical: No cervical adenopathy. Skin:     General: Skin is warm. Neurological:      Mental Status: He is alert and oriented to person, place, and time. Deep Tendon Reflexes: Reflexes are normal and symmetric. Assessment:      Diagnosis Orders   1. Uncontrolled type 2 diabetes mellitus with hyperglycemia (HCC)  POCT glycosylated hemoglobin (Hb A1C)    POCT microalbumin    HM DIABETES FOOT EXAM   2. Need for influenza vaccination  INFLUENZA, MDCK QUADV, 2 YRS AND OLDER, IM, PF, PREFILL SYR OR SDV, 0.5ML (FLUCELVAX QUADV, PF)       Plan:      No follow-ups on file. Orders Placed This Encounter   Procedures    INFLUENZA, MDCK QUADV, 2 YRS AND OLDER, IM, PF, PREFILL SYR OR SDV, 0.5ML (FLUCELVAX QUADV, PF)    POCT glycosylated hemoglobin (Hb A1C)    POCT microalbumin    HM DIABETES FOOT EXAM     No orders of the defined types were placed in this encounter. Pt is doing very well. Cont meds  Fu in 3months    Patient given educational materials - seepatient instructions. Discussed use, benefit, and side effects of prescribed medications. All patient questions answered. Pt voiced understanding. Patient agreed withtreatment plan. Follow up as directed.      Electronically signed by CARMEN Martinez CNP on 12/13/2021 at 12:57 PM

## 2021-12-13 NOTE — PROGRESS NOTES
Immunizations Administered     Name Date Dose Route    Influenza, MDCK Quadv, IM, PF (Flucelvax 2 yrs and older) 12/13/2021 0.5 mL Intramuscular    Site: Deltoid- Left    Lot: [id number]    NDC: 12038-853-82          VIS GIVEN. CONSENT SIGNED  PATIENT TOLERATED WELL.

## 2022-01-10 ENCOUNTER — PATIENT MESSAGE (OUTPATIENT)
Dept: FAMILY MEDICINE CLINIC | Age: 47
End: 2022-01-10

## 2022-01-10 DIAGNOSIS — E11.65 UNCONTROLLED TYPE 2 DIABETES MELLITUS WITH HYPERGLYCEMIA (HCC): ICD-10-CM

## 2022-01-10 NOTE — TELEPHONE ENCOUNTER
From: Shreyas La  To: Liu Padmini  Sent: 1/10/2022 9:41 AM EST  Subject: Huerta Mel    I hope this message finds you well. My pharmacy had taken a really long time to fill my farxiga this week, but I believe it may have been a blessing in disguise. It has been nearly a week without it, and my numbers have not changed at all with the glucose levels. I was curious if Ricardo Araya was even working, as I have not lost even half a pound while taking it. I know the half life of far cigs is only 12 hours, so there is little doubts the medication is completely gone from my system at this point. Im curious if you think We should increase the dosage or go without it completely, as I am starting to get some stomach issues taking pills. I actually believe it is the metformin causing most issues, however. But it seems the metformin is doing all the work. Please advise.

## 2022-01-25 ENCOUNTER — PATIENT MESSAGE (OUTPATIENT)
Dept: FAMILY MEDICINE CLINIC | Age: 47
End: 2022-01-25

## 2022-01-25 RX ORDER — METFORMIN HYDROCHLORIDE 500 MG/1
1000 TABLET, EXTENDED RELEASE ORAL
Qty: 180 TABLET | Refills: 1 | Status: SHIPPED | OUTPATIENT
Start: 2022-01-25 | End: 2022-06-28

## 2022-01-25 NOTE — TELEPHONE ENCOUNTER
From: Joseph Nicholson  To: Liu Washington  Sent: 1/25/2022 8:45 AM EST  Subject: Metformin     Good morning Liu. Susu Mott been doing some reading about the metformin with people with sensitive stomachs, and have found that many have found relief by switching to the extended release version. I really need to come up with a solution quickly, as my quality of life is really poor right now, with the constant sickness in my intestines consistently looming. Just last night I was up heaving and forcing myself to keep liquids down. I was hoping youd have some input on a solution. Im still using probiotics, but Im just past that point that they are helping.  Thank you

## 2022-01-26 DIAGNOSIS — J44.9 CHRONIC OBSTRUCTIVE PULMONARY DISEASE, UNSPECIFIED COPD TYPE (HCC): ICD-10-CM

## 2022-01-27 RX ORDER — ALBUTEROL SULFATE 90 UG/1
AEROSOL, METERED RESPIRATORY (INHALATION)
Qty: 36 G | Refills: 3 | Status: SHIPPED | OUTPATIENT
Start: 2022-01-27 | End: 2022-06-27

## 2022-02-08 RX ORDER — ERGOCALCIFEROL 1.25 MG/1
CAPSULE ORAL
Qty: 8 CAPSULE | Refills: 5 | Status: SHIPPED | OUTPATIENT
Start: 2022-02-08

## 2022-03-23 RX ORDER — IPRATROPIUM BROMIDE 17 UG/1
AEROSOL, METERED RESPIRATORY (INHALATION)
Qty: 12.9 G | Refills: 2 | Status: SHIPPED | OUTPATIENT
Start: 2022-03-23

## 2022-06-05 DIAGNOSIS — J44.9 CHRONIC OBSTRUCTIVE PULMONARY DISEASE, UNSPECIFIED COPD TYPE (HCC): ICD-10-CM

## 2022-06-26 DIAGNOSIS — J44.9 CHRONIC OBSTRUCTIVE PULMONARY DISEASE, UNSPECIFIED COPD TYPE (HCC): ICD-10-CM

## 2022-06-27 RX ORDER — ALBUTEROL SULFATE 90 UG/1
AEROSOL, METERED RESPIRATORY (INHALATION)
Qty: 36 G | Refills: 3 | Status: SHIPPED | OUTPATIENT
Start: 2022-06-27

## 2022-06-28 ENCOUNTER — TELEMEDICINE (OUTPATIENT)
Dept: FAMILY MEDICINE CLINIC | Age: 47
End: 2022-06-28
Payer: COMMERCIAL

## 2022-06-28 DIAGNOSIS — N45.1 EPIDIDYMITIS: ICD-10-CM

## 2022-06-28 DIAGNOSIS — E11.65 UNCONTROLLED TYPE 2 DIABETES MELLITUS WITH HYPERGLYCEMIA (HCC): ICD-10-CM

## 2022-06-28 DIAGNOSIS — E55.9 VITAMIN D DEFICIENCY: ICD-10-CM

## 2022-06-28 DIAGNOSIS — Z12.11 SCREEN FOR COLON CANCER: ICD-10-CM

## 2022-06-28 DIAGNOSIS — J44.9 CHRONIC OBSTRUCTIVE PULMONARY DISEASE, UNSPECIFIED COPD TYPE (HCC): Primary | ICD-10-CM

## 2022-06-28 PROCEDURE — 99214 OFFICE O/P EST MOD 30 MIN: CPT | Performed by: NURSE PRACTITIONER

## 2022-06-28 RX ORDER — SULFAMETHOXAZOLE AND TRIMETHOPRIM 800; 160 MG/1; MG/1
1 TABLET ORAL 2 TIMES DAILY
Qty: 20 TABLET | Refills: 0 | Status: SHIPPED | OUTPATIENT
Start: 2022-06-28 | End: 2022-07-08

## 2022-06-28 ASSESSMENT — ENCOUNTER SYMPTOMS
RESPIRATORY NEGATIVE: 1
GASTROINTESTINAL NEGATIVE: 1
EYES NEGATIVE: 1

## 2022-06-28 NOTE — PROGRESS NOTES
2022    TELEHEALTH EVALUATION -- Audio/Visual (During ZCBND-80 public health emergency)    HPI:    Michel Colon (:  1975) has requested an audio/video evaluation for the following concern(s):    Pt here for fu. Pt reports his sugar has been doing very well. Running about 115. He had stopped his metformin and farxiga. He does report that he has a flair of his epididymitis. Breathing has been ok,  Fibromyalgia comes and goes. Mentally he is doing well      Review of Systems   Constitutional: Negative. HENT: Negative. Eyes: Negative. Respiratory: Negative. Cardiovascular: Negative. Gastrointestinal: Negative. Genitourinary: Positive for testicular pain. Musculoskeletal: Negative. Skin: Negative. Neurological: Negative. Prior to Visit Medications    Medication Sig Taking? Authorizing Provider   sulfamethoxazole-trimethoprim (BACTRIM DS;SEPTRA DS) 800-160 MG per tablet Take 1 tablet by mouth 2 times daily for 10 days Yes CARMEN Ye CNP   albuterol sulfate HFA (PROVENTIL;VENTOLIN;PROAIR) 108 (90 Base) MCG/ACT inhaler inhale 2 puffs by mouth and INTO THE LUNGS every 6 hours if needed for wheezing  CARMEN Ye CNP   mometasone-formoterol (DULERA) 200-5 MCG/ACT inhaler inhale 2 puffs by mouth and INTO THE LUNGS every 12 hours  CARMEN Ye CNP   ATROVENT HFA 17 MCG/ACT inhaler inhale 2 puffs by mouth and INTO THE LUNGS three times a day  CARMEN Ye CNP   vitamin D (ERGOCALCIFEROL) 1.25 MG (24778 UT) CAPS capsule take 1 capsule by mouth TWO TIMES PER WEEK  Anita Hart MD   Continuous Blood Gluc Sensor (FREESTYLE ARIEL 14 DAY SENSOR) [facility] system as directed. Uncontrolled diabetes  CARMEN Ye CNP   Continuous Blood Gluc  (FREESTYLE ARIEL 14 DAY READER) ISAIAH As directed.   Dx uncontrolled diabetes  CARMEN Ye CNP   Continuous Blood Gluc Sensor ([facility]) MISC As directed  , APRN - CNP   albuterol (PROVENTIL) (2.5 MG/3ML) 0.083% nebulizer solution inhale contents of 1 vial in nebulizer every 6 hours if needed  , APRN - CNP   fluocinolone acetonide (SYNALAR) 0.01 % external solution apply to affected area twice a day  , APRN - CNP   glucose monitoring kit (FREESTYLE) monitoring kit Dispense glucometer with test strips and lancets. #100, check BS daily  CARMEN Ye CNP   NITROSTAT 0.4 MG SL tablet place 1 tablet under the tongue if needed every 5 minutes for chest pain for 3 doses IF NO RELIEF AFTER 3RD DOSE CALL PRESCRIBER . Patient not taking: Reported on 10/1/2021  CARMEN Ye CNP   Respiratory Therapy Supplies ISAIAH cpap and supplies, dx GLORIA  CARMEN Ye CNP   Nebulizers (COMPRESSOR/NEBULIZER) MISC Nebulizer with tubing dx asthma  CARMEN Ye CNP   EPINEPHrine (EPIPEN) 0.3 MG/0.3ML ISAIAH injection Inject 0.3 mLs into the muscle once as needed for 1 dose.   Patient not taking: Reported on 10/1/2021  CARMEN FUCHS CNP       Social History     Tobacco Use    Smoking status: Current Every Day Smoker     Packs/day: 1.50     Years: 20.00     Pack years: 30.00     Types: Cigarettes    Smokeless tobacco: Never Used   Substance Use Topics    Alcohol use: No     Alcohol/week: 0.0 standard drinks    Drug use: No        Allergies   Allergen Reactions    Allopurinol      Sore throat      Olanzapine     Quetiapine     Seroquel [Quetiapine Fumarate]    ,   Past Medical History:   Diagnosis Date    Asthma     moderate severity    Bipolar disorder (HCC)     Chicken pox     Fibromyalgia     tick disorder    Hemorrhoids     Irritable bowel syndrome     Migraine     MTHFR (methylene THF reductase) deficiency and homocystinuria (HCC)     Obesity     Obstructive sleep apnea     Parkinson's syndrome (HCC)     Peripheral neuropathy     Schizophrenia, schizo-affective ([facility]     Tourette syndrome     Vitamin D deficiency     low Vitamin D   ,   Past Surgical History:   Procedure Laterality Date    CARDIAC CATHETERIZATION  5-11-12    Minimal nonobstructive CAD of ostial and proximal LAD around 15% stenosis, otherwise other coronary arteries are widely patent. Moderate to markedly reduced LV systolic function, EF 69-65%.  CARDIOVASCULAR STRESS TEST  4-27-12    Sinus rhythm w/ nonspecific ST-T changes. During exercise and in recovery phase, no significant ST-T changes noted. Patient's resting heart rate 113 bpm, during exercise,  bpm which is 94% of maximum predicted HR. Resting /85 mmHg. During exercise, BP went up to 188/70 mmHg which is adequate BP response. Questionable mild anterior wall ischemia. EF 52%, TID ratio 1.0.     CARPAL TUNNEL RELEASE Right 2003    r hand    TESTICLE TORSION REDUCTION  age 9    TRANSTHORACIC ECHOCARDIOGRAM  5-11-12    LV size normal, systolic function moderately reduced, EF 40%. Moderate diffuse hypokinesis.  LA mildly dilated.    ,   Social History     Tobacco Use    Smoking status: Current Every Day Smoker     Packs/day: 1.50     Years: 20.00     Pack years: 30.00     Types: Cigarettes    Smokeless tobacco: Never Used   Substance Use Topics    Alcohol use: No     Alcohol/week: 0.0 standard drinks    Drug use: No   ,   Family History   Problem Relation Age of Onset    Arthritis Mother     High Blood Pressure Father     High Cholesterol Father     Thyroid Disease Brother     Schizophrenia Paternal Uncle     Diabetes Other         grandmother    Mental Illness Other         uncle    Heart Disease Other         grandfather, father had stroke       PHYSICAL EXAMINATION:  [ INSTRUCTIONS:  \"[x]\" Indicates a positive item  \"[]\" Indicates a negative item  -- DELETE ALL ITEMS NOT EXAMINED]  Vital Signs: (As obtained by patient/caregiver or practitioner observation)    Blood pressure-  Heart rate-    Respiratory rate-    Temperature- Pulse oximetry-     Constitutional: [x] Appears well-developed and well-nourished [] No apparent distress      [] Abnormal-   Mental status  [] Alert and awake  [] Oriented to person/place/time []Able to follow commands      Eyes:  EOM    []  Normal  [] Abnormal-  Sclera  []  Normal  [] Abnormal -         Discharge []  None visible  [] Abnormal -    HENT:   [] Normocephalic, atraumatic. [] Abnormal   [] Mouth/Throat: Mucous membranes are moist.     External Ears [] Normal  [] Abnormal-     Neck: [] No visualized mass     Pulmonary/Chest: [x] Respiratory effort normal.  [] No visualized signs of difficulty breathing or respiratory distress        [] Abnormal-      Musculoskeletal:   [] Normal gait with no signs of ataxia         [] Normal range of motion of neck        [] Abnormal-       Neurological:        [] No Facial Asymmetry (Cranial nerve 7 motor function) (limited exam to video visit)          [] No gaze palsy        [] Abnormal-         Skin:        [] No significant exanthematous lesions or discoloration noted on facial skin         [] Abnormal-            Psychiatric:       [] Normal Affect [] No Hallucinations        [] Abnormal-     Other pertinent observable physical exam findings-     ASSESSMENT/PLAN:  1. Chronic obstructive pulmonary disease, unspecified COPD type ([facility]  Pt cont to smoke    2. Uncontrolled type 2 diabetes mellitus with hyperglycemia [facility]  Will get updated labs  - CBC with Auto Differential; Future  - Comprehensive Metabolic Panel; Future  - Hemoglobin A1C; Future  - Lipid Panel; Future    3. Vitamin D deficiency  Cont meds and get updated labs  - Vitamin D 25 Hydroxy; Future    4. Epididymitis  rx for bactrim  - sulfamethoxazole-trimethoprim (BACTRIM DS;SEPTRA DS) 800-160 MG per tablet; Take 1 tablet by mouth 2 times daily for 10 days  Dispense: 20 tablet; Refill: 0    5.  Screen for colon cancer  cologuard ordered  - Fecal DNA Colorectal cancer screening (Cologuard)      No follow-ups on file. Dianna Vizcarra, was evaluated through a synchronous (real-time) audio-video encounter. The patient (or guardian if applicable) is aware that this is a billable service, which includes applicable co-pays. This Virtual Visit was conducted with patient's (and/or legal guardian's) consent. The visit was conducted pursuant to the emergency declaration under the [address] [place] [address], [facility] and the [employer] Act. Patient identification was verified, and a caregiver was present when appropriate. The patient was located at Home: [address]. Provider was located at [place] [address]): [address] [place] [address] [address]  [facility],  [facility]. Total time spent on this encounter: Not billed by time    --CARMEN Prado CNP on 6/28/2022 at 11:35 AM    An electronic signature was used to authenticate this note.

## 2022-11-11 DIAGNOSIS — J44.9 CHRONIC OBSTRUCTIVE PULMONARY DISEASE, UNSPECIFIED COPD TYPE (HCC): ICD-10-CM

## 2022-11-11 RX ORDER — ALBUTEROL SULFATE 90 UG/1
AEROSOL, METERED RESPIRATORY (INHALATION)
Qty: 36 G | Refills: 3 | Status: SHIPPED | OUTPATIENT
Start: 2022-11-11

## 2022-12-01 DIAGNOSIS — E11.65 UNCONTROLLED TYPE 2 DIABETES MELLITUS WITH HYPERGLYCEMIA (HCC): ICD-10-CM

## 2022-12-02 RX ORDER — FLASH GLUCOSE SENSOR
KIT MISCELLANEOUS
Qty: 12 EACH | Refills: 3 | Status: SHIPPED | OUTPATIENT
Start: 2022-12-02

## 2023-01-06 ENCOUNTER — TELEMEDICINE (OUTPATIENT)
Dept: FAMILY MEDICINE CLINIC | Age: 48
End: 2023-01-06
Payer: COMMERCIAL

## 2023-01-06 DIAGNOSIS — E11.65 UNCONTROLLED TYPE 2 DIABETES MELLITUS WITH HYPERGLYCEMIA (HCC): ICD-10-CM

## 2023-01-06 DIAGNOSIS — F31.12 BIPOLAR AFFECTIVE DISORDER, CURRENTLY MANIC, MODERATE (HCC): Primary | ICD-10-CM

## 2023-01-06 PROCEDURE — 99214 OFFICE O/P EST MOD 30 MIN: CPT | Performed by: NURSE PRACTITIONER

## 2023-01-06 RX ORDER — DIVALPROEX SODIUM 250 MG/1
250 TABLET, DELAYED RELEASE ORAL 3 TIMES DAILY
Qty: 90 TABLET | Refills: 3 | Status: SHIPPED | OUTPATIENT
Start: 2023-01-06

## 2023-01-06 RX ORDER — OFLOXACIN 3 MG/ML
SOLUTION/ DROPS OPHTHALMIC
Qty: 1 EACH | Refills: 1 | Status: SHIPPED | OUTPATIENT
Start: 2023-01-06

## 2023-01-06 ASSESSMENT — ENCOUNTER SYMPTOMS
RESPIRATORY NEGATIVE: 1
GASTROINTESTINAL NEGATIVE: 1
EYES NEGATIVE: 1

## 2023-01-06 NOTE — PROGRESS NOTES
2023    TELEHEALTH EVALUATION -- Audio/Visual (During MYOKX-62 public health emergency)    HPI:    Vera Santizo (:  1975) has requested an audio/video evaluation for the following concern(s):    Pt here with acute episode of bipolar carlton. Reports he has been cycling for a month. Periods of carlton followed by depression. Severe anxeity and intrusive thoughts. Previously saw psych but not now. Does feel stable at this moment. We also reviewed his sugar and need to get labs    Review of Systems   Constitutional: Negative. HENT: Negative. Eyes: Negative. Respiratory: Negative. Cardiovascular: Negative. Gastrointestinal: Negative. Musculoskeletal: Negative. Skin: Negative. Neurological: Negative. Psychiatric/Behavioral:  Positive for agitation, decreased concentration and sleep disturbance. Negative for suicidal ideas. The patient is nervous/anxious. Prior to Visit Medications    Medication Sig Taking?  Authorizing Provider   divalproex (DEPAKOTE) 250 MG DR tablet Take 1 tablet by mouth 3 times daily Yes CARMEN Ye CNP   ofloxacin (OCUFLOX) 0.3 % solution 4gtts bid to ear Yes CARMEN Ye CNP   Continuous Blood Gluc Sensor (FREESTYLE ARIEL 14 DAY SENSOR) [facility] apply 1 SENSOR to back OF UPPER ARM REMOVE AND REPLACE every 14 days use with DEVICE to MONITOR BLOOD SUGAR  CARMEN Ye CNP   albuterol sulfate HFA (PROVENTIL;VENTOLIN;PROAIR) 108 (90 Base) MCG/ACT inhaler inhale 2 puffs by mouth and INTO THE LUNGS every 6 hours if needed for wheezing  CARMEN Ye CNP   mometasone-formoterol (DULERA) 200-5 MCG/ACT inhaler inhale 2 puffs by mouth and INTO THE LUNGS every 12 hours  CARMEN Ye CNP   ATROVENT HFA 17 MCG/ACT inhaler inhale 2 puffs by mouth and INTO THE LUNGS three times a day  , APRN - CNP   vitamin D (ERGOCALCIFEROL) 1.25 MG (15896 UT) CAPS capsule take 1 capsule by mouth TWO TIMES PER WEEK  Demetria Nancie Castaneda MD   Continuous Blood Gluc  (FREESTYLE ARIEL 14 DAY READER) ISAIAH As directed. Dx uncontrolled diabetes  CARMEN Ye CNP   Continuous Blood Gluc Sensor (FREESTYLE ARIEL SENSOR SYSTEM) [facility] As directed  CARMEN Ye CNP   albuterol (PROVENTIL) (2.5 MG/3ML) 0.083% nebulizer solution inhale contents of 1 vial in nebulizer every 6 hours if needed  , APRN - CNP   fluocinolone acetonide (SYNALAR) 0.01 % external solution apply to affected area twice a day  , APRN - CNP   glucose monitoring kit (FREESTYLE) monitoring kit Dispense glucometer with test strips and lancets. #100, check BS daily  CARMEN Ye CNP   NITROSTAT 0.4 MG SL tablet place 1 tablet under the tongue if needed every 5 minutes for chest pain for 3 doses IF NO RELIEF AFTER 3RD DOSE CALL PRESCRIBER . Patient not taking: Reported on 10/1/2021  CARMEN Ye CNP   Respiratory Therapy Supplies ISAIAH cpap and supplies, dx GLORIA  CARMEN Ye CNP   Nebulizers (COMPRESSOR/NEBULIZER) MISC Nebulizer with tubing dx asthma  CARMEN Ye CNP   EPINEPHrine (EPIPEN) 0.3 MG/0.3ML ISAIAH injection Inject 0.3 mLs into the muscle once as needed for 1 dose.   Patient not taking: Reported on 10/1/2021  CARMEN FUCHS CNP       Social History     Tobacco Use    Smoking status: Every Day     Packs/day: 1.50     Years: 20.00     Pack years: 30.00     Types: Cigarettes    Smokeless tobacco: Never   Substance Use Topics    Alcohol use: No     Alcohol/week: 0.0 standard drinks    Drug use: No        Allergies   Allergen Reactions    Allopurinol      Sore throat      Olanzapine     Quetiapine     Seroquel [Quetiapine Fumarate]    ,   Past Medical History:   Diagnosis Date    Asthma     moderate severity    Bipolar disorder (HCC)     Chicken pox     Fibromyalgia     tick disorder    Hemorrhoids     Irritable bowel syndrome     Migraine     MTHFR (methylene THF reductase) deficiency and homocystinuria (HCC)     Obesity     Obstructive sleep apnea     Parkinson's syndrome (HCC)     Peripheral neuropathy     Schizophrenia, schizo-affective (HCC)     Tourette syndrome     Vitamin D deficiency     low Vitamin D   ,   Past Surgical History:   Procedure Laterality Date    CARDIAC CATHETERIZATION  5-11-12    Minimal nonobstructive CAD of ostial and proximal LAD around 15% stenosis, otherwise other coronary arteries are widely patent. Moderate to markedly reduced LV systolic function, EF 86-46%. CARDIOVASCULAR STRESS TEST  4-27-12    Sinus rhythm w/ nonspecific ST-T changes. During exercise and in recovery phase, no significant ST-T changes noted. Patient's resting heart rate 113 bpm, during exercise,  bpm which is 94% of maximum predicted HR. Resting /85 mmHg. During exercise, BP went up to 188/70 mmHg which is adequate BP response. Questionable mild anterior wall ischemia. EF 52%, TID ratio 1.0.     CARPAL TUNNEL RELEASE Right 2003    r hand    TESTICLE TORSION REDUCTION  age 9    TRANSTHORACIC ECHOCARDIOGRAM  5-11-12    LV size normal, systolic function moderately reduced, EF 40%. Moderate diffuse hypokinesis.  LA mildly dilated.    ,   Social History     Tobacco Use    Smoking status: Every Day     Packs/day: 1.50     Years: 20.00     Pack years: 30.00     Types: Cigarettes    Smokeless tobacco: Never   Substance Use Topics    Alcohol use: No     Alcohol/week: 0.0 standard drinks    Drug use: No   ,   Family History   Problem Relation Age of Onset    Arthritis Mother     High Blood Pressure Father     High Cholesterol Father     Thyroid Disease Brother     Schizophrenia Paternal Uncle     Diabetes Other         grandmother    Mental Illness Other         uncle    Heart Disease Other         grandfather, father had stroke   ,   Immunization History   Administered Date(s) Administered    COVID-19, MODERNA BLUE border, Primary or Immunocompromised, (age 12y+), IM, 100 mcg/0.5mL 05/03/2021, 06/07/2021    COVID-19, MODERNA Bivalent BOOSTER, (age 12y+), IM, 50 mcg/0.5 mL 10/10/2022    Influenza Virus Vaccine 01/15/2015, 11/18/2015    Influenza, FLUARIX, FLULAVAL, FLUZONE (age 10 mo+) AND AFLURIA, (age 1 y+), PF, 0.5mL 12/29/2017    Influenza, FLUCELVAX, (age 10 mo+), MDCK, PF, 0.5mL 12/13/2021    Pneumococcal Polysaccharide ([id number]) 11/18/2015       PHYSICAL EXAMINATION:  [ INSTRUCTIONS:  \"[x]\" Indicates a positive item  \"[]\" Indicates a negative item  -- DELETE ALL ITEMS NOT EXAMINED]  Vital Signs: (As obtained by patient/caregiver or practitioner observation)    Blood pressure-  Heart rate-    Respiratory rate-    Temperature-  Pulse oximetry-     Constitutional: [x] Appears well-developed and well-nourished [] No apparent distress      [] Abnormal-   Mental status  [] Alert and awake  [] Oriented to person/place/time []Able to follow commands      Eyes:  EOM    []  Normal  [] Abnormal-  Sclera  []  Normal  [] Abnormal -         Discharge []  None visible  [] Abnormal -    HENT:   [] Normocephalic, atraumatic. [] Abnormal   [] Mouth/Throat: Mucous membranes are moist.     External Ears [] Normal  [] Abnormal-     Neck: [] No visualized mass     Pulmonary/Chest: [] Respiratory effort normal.  [] No visualized signs of difficulty breathing or respiratory distress        [] Abnormal-      Musculoskeletal:   [] Normal gait with no signs of ataxia         [] Normal range of motion of neck        [] Abnormal-       Neurological:        [] No Facial Asymmetry (Cranial nerve 7 motor function) (limited exam to video visit)          [] No gaze palsy        [] Abnormal-         Skin:        [] No significant exanthematous lesions or discoloration noted on facial skin         [] Abnormal-            Psychiatric:       [x] Normal Affect [] No Hallucinations        [] Abnormal-     Other pertinent observable physical exam findings-     ASSESSMENT/PLAN:  1.  Bipolar affective disorder, currently manic, moderate ([facility]  Will start depakote  Recheck in 1 week  - divalproex (DEPAKOTE) 250 MG DR tablet; Take 1 tablet by mouth 3 times daily  Dispense: 90 tablet; Refill: 3    2. Uncontrolled type 2 diabetes mellitus with hyperglycemia ([facility]  Advised to get labs done    No follow-ups on file. Marika Gonzales, was evaluated through a synchronous (real-time) audio-video encounter. The patient (or guardian if applicable) is aware that this is a billable service, which includes applicable co-pays. This Virtual Visit was conducted with patient's (and/or legal guardian's) consent. The visit was conducted pursuant to the emergency declaration under the [address] [place] [address], [address] [place] [address] authority and the [employer] and [employer] General Act. Patient identification was verified, and a caregiver was present when appropriate. The patient was located at Home: [address]. Provider was located at [place] [address]): [address] [place] [address] [address]  [facility],  [facility]. Total time spent on this encounter: Not billed by time    --CARMEN Reilly CNP on 1/6/2023 at 12:14 PM    An electronic signature was used to authenticate this note.

## 2023-01-23 ENCOUNTER — TELEPHONE (OUTPATIENT)
Dept: FAMILY MEDICINE CLINIC | Age: 48
End: 2023-01-23

## 2023-01-23 RX ORDER — IPRATROPIUM BROMIDE 17 UG/1
AEROSOL, METERED RESPIRATORY (INHALATION)
Qty: 12.9 G | Refills: 2 | Status: SHIPPED | OUTPATIENT
Start: 2023-01-23

## 2023-03-02 ENCOUNTER — OFFICE VISIT (OUTPATIENT)
Dept: FAMILY MEDICINE CLINIC | Age: 48
End: 2023-03-02

## 2023-03-02 ENCOUNTER — NURSE ONLY (OUTPATIENT)
Dept: LAB | Age: 48
End: 2023-03-02

## 2023-03-02 VITALS
TEMPERATURE: 97.7 F | SYSTOLIC BLOOD PRESSURE: 122 MMHG | HEART RATE: 78 BPM | OXYGEN SATURATION: 95 % | WEIGHT: 315 LBS | RESPIRATION RATE: 16 BRPM | HEIGHT: 70 IN | DIASTOLIC BLOOD PRESSURE: 44 MMHG | BODY MASS INDEX: 45.1 KG/M2

## 2023-03-02 DIAGNOSIS — E11.9 TYPE 2 DIABETES MELLITUS WITHOUT COMPLICATION, WITHOUT LONG-TERM CURRENT USE OF INSULIN (HCC): ICD-10-CM

## 2023-03-02 DIAGNOSIS — F31.12 BIPOLAR AFFECTIVE DISORDER, CURRENTLY MANIC, MODERATE (HCC): ICD-10-CM

## 2023-03-02 DIAGNOSIS — E55.9 VITAMIN D DEFICIENCY: ICD-10-CM

## 2023-03-02 DIAGNOSIS — E11.9 TYPE 2 DIABETES MELLITUS WITHOUT COMPLICATION, WITHOUT LONG-TERM CURRENT USE OF INSULIN (HCC): Primary | ICD-10-CM

## 2023-03-02 DIAGNOSIS — Z12.5 SCREENING FOR PROSTATE CANCER: ICD-10-CM

## 2023-03-02 LAB — HBA1C MFR BLD: 5.8 % (ref 4.3–5.7)

## 2023-03-02 SDOH — ECONOMIC STABILITY: FOOD INSECURITY: WITHIN THE PAST 12 MONTHS, YOU WORRIED THAT YOUR FOOD WOULD RUN OUT BEFORE YOU GOT MONEY TO BUY MORE.: NEVER TRUE

## 2023-03-02 SDOH — ECONOMIC STABILITY: HOUSING INSECURITY
IN THE LAST 12 MONTHS, WAS THERE A TIME WHEN YOU DID NOT HAVE A STEADY PLACE TO SLEEP OR SLEPT IN A SHELTER (INCLUDING NOW)?: NO

## 2023-03-02 SDOH — ECONOMIC STABILITY: INCOME INSECURITY: HOW HARD IS IT FOR YOU TO PAY FOR THE VERY BASICS LIKE FOOD, HOUSING, MEDICAL CARE, AND HEATING?: NOT HARD AT ALL

## 2023-03-02 SDOH — ECONOMIC STABILITY: FOOD INSECURITY: WITHIN THE PAST 12 MONTHS, THE FOOD YOU BOUGHT JUST DIDN'T LAST AND YOU DIDN'T HAVE MONEY TO GET MORE.: NEVER TRUE

## 2023-03-02 ASSESSMENT — PATIENT HEALTH QUESTIONNAIRE - PHQ9
4. FEELING TIRED OR HAVING LITTLE ENERGY: 3
4. FEELING TIRED OR HAVING LITTLE ENERGY: 3
5. POOR APPETITE OR OVEREATING: 3
6. FEELING BAD ABOUT YOURSELF - OR THAT YOU ARE A FAILURE OR HAVE LET YOURSELF OR YOUR FAMILY DOWN: 3
2. FEELING DOWN, DEPRESSED OR HOPELESS: 3
9. THOUGHTS THAT YOU WOULD BE BETTER OFF DEAD, OR OF HURTING YOURSELF: 0
SUM OF ALL RESPONSES TO PHQ9 QUESTIONS 1 & 2: 6
2. FEELING DOWN, DEPRESSED OR HOPELESS: 3
10. IF YOU CHECKED OFF ANY PROBLEMS, HOW DIFFICULT HAVE THESE PROBLEMS MADE IT FOR YOU TO DO YOUR WORK, TAKE CARE OF THINGS AT HOME, OR GET ALONG WITH OTHER PEOPLE: 2
SUM OF ALL RESPONSES TO PHQ QUESTIONS 1-9: 24
3. TROUBLE FALLING OR STAYING ASLEEP: 3
SUM OF ALL RESPONSES TO PHQ QUESTIONS 1-9: 24
5. POOR APPETITE OR OVEREATING: 3
10. IF YOU CHECKED OFF ANY PROBLEMS, HOW DIFFICULT HAVE THESE PROBLEMS MADE IT FOR YOU TO DO YOUR WORK, TAKE CARE OF THINGS AT HOME, OR GET ALONG WITH OTHER PEOPLE: 2
8. MOVING OR SPEAKING SO SLOWLY THAT OTHER PEOPLE COULD HAVE NOTICED. OR THE OPPOSITE, BEING SO FIGETY OR RESTLESS THAT YOU HAVE BEEN MOVING AROUND A LOT MORE THAN USUAL: 3
7. TROUBLE CONCENTRATING ON THINGS, SUCH AS READING THE NEWSPAPER OR WATCHING TELEVISION: 3
9. THOUGHTS THAT YOU WOULD BE BETTER OFF DEAD, OR OF HURTING YOURSELF: 0
SUM OF ALL RESPONSES TO PHQ QUESTIONS 1-9: 24
1. LITTLE INTEREST OR PLEASURE IN DOING THINGS: 3
3. TROUBLE FALLING OR STAYING ASLEEP: 3
8. MOVING OR SPEAKING SO SLOWLY THAT OTHER PEOPLE COULD HAVE NOTICED. OR THE OPPOSITE, BEING SO FIGETY OR RESTLESS THAT YOU HAVE BEEN MOVING AROUND A LOT MORE THAN USUAL: 3
SUM OF ALL RESPONSES TO PHQ QUESTIONS 1-9: 24
SUM OF ALL RESPONSES TO PHQ QUESTIONS 1-9: 24
1. LITTLE INTEREST OR PLEASURE IN DOING THINGS: 3
SUM OF ALL RESPONSES TO PHQ QUESTIONS 1-9: 24
SUM OF ALL RESPONSES TO PHQ9 QUESTIONS 1 & 2: 6
6. FEELING BAD ABOUT YOURSELF - OR THAT YOU ARE A FAILURE OR HAVE LET YOURSELF OR YOUR FAMILY DOWN: 3
7. TROUBLE CONCENTRATING ON THINGS, SUCH AS READING THE NEWSPAPER OR WATCHING TELEVISION: 3

## 2023-03-02 ASSESSMENT — ENCOUNTER SYMPTOMS
GASTROINTESTINAL NEGATIVE: 1
RESPIRATORY NEGATIVE: 1
EYES NEGATIVE: 1

## 2023-03-02 ASSESSMENT — COLUMBIA-SUICIDE SEVERITY RATING SCALE - C-SSRS
6. HAVE YOU EVER DONE ANYTHING, STARTED TO DO ANYTHING, OR PREPARED TO DO ANYTHING TO END YOUR LIFE?: YES
1. WITHIN THE PAST MONTH, HAVE YOU WISHED YOU WERE DEAD OR WISHED YOU COULD GO TO SLEEP AND NOT WAKE UP?: NO
2. HAVE YOU ACTUALLY HAD ANY THOUGHTS OF KILLING YOURSELF?: NO
1. WITHIN THE PAST MONTH, HAVE YOU WISHED YOU WERE DEAD OR WISHED YOU COULD GO TO SLEEP AND NOT WAKE UP?: NO
2. HAVE YOU ACTUALLY HAD ANY THOUGHTS OF KILLING YOURSELF?: NO
7. DID THIS OCCUR IN THE LAST THREE MONTHS: NO
6. HAVE YOU EVER DONE ANYTHING, STARTED TO DO ANYTHING, OR PREPARED TO DO ANYTHING TO END YOUR LIFE?: NO

## 2023-03-02 NOTE — PROGRESS NOTES
Marge Steel is a 50 y.o. male whopresents today for :  Chief Complaint   Patient presents with    Follow-up     DM2 and anxiety       HPI:     HPI  Pt here with acute anxeity. Reports that he is having a lot marital issues. Left his wife last week. He has been seeing a counselor. Feeling very stressed. Denies suicide thoughts. Pt feels his root of his problem is his wife is very controlling.   His sguar is doing well  Hemoglobin A1C   Date Value Ref Range Status   03/02/2023 5.8 (H) 4.3 - 5.7 % Final     Comment:     Performed at [address] under CLIA #  [id number]          Patient Active Problem List   Diagnosis    Fibromyalgia syndrome    Tourette syndrome    Asthma    Restless legs syndrome (RLS)    Irritable bowel syndrome    Arthritis- multiple joints-under work up for it by [facility]    Chest pain, atypical -Noncardiac    Abnormal nuclear cardiac imaging test-borderline for ischemia in the anterior wall-with negative cath    Tobacco dependence    Morbid obesity ([address]    S/P cardiac cath- may 2012- minimal nonobstructive CAD- prox and ostial LAD    Cardiomyopathy- nonischemic-EF 40% by echo and cath 52% by [facility]    Hypogonadism male    COPD (chronic obstructive pulmonary disease) ([address]    MTHFR mutation (methylenetetrahydrofolate reductase)    Bipolar 1 disorder ([address]    Schizophrenia ([address]    Obesity due to excess calories    Type 2 diabetes mellitus without complication, without long-term current use of insulin (HCC)    Adrenal adenoma        Past Medical History:   Diagnosis Date    Asthma     moderate severity    Bipolar disorder (HCC)     Chicken pox     Fibromyalgia     tick disorder    Hemorrhoids     Irritable bowel syndrome     Migraine     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 Moderate to markedly reduced LV systolic function, EF 52-72%. CARDIOVASCULAR STRESS TEST  4-27-12    Sinus rhythm w/ nonspecific ST-T changes. During exercise and in recovery phase, no significant ST-T changes noted. Patient's resting heart rate 113 bpm, during exercise,  bpm which is 94% of maximum predicted HR. Resting /85 mmHg. During exercise, BP went up to 188/70 mmHg which is adequate BP response. Questionable mild anterior wall ischemia. EF 52%, TID ratio 1.0.     CARPAL TUNNEL RELEASE Right 2003    r hand    TESTICLE TORSION REDUCTION  age 9    TRANSTHORACIC ECHOCARDIOGRAM  5-11-12    LV size normal, systolic function moderately reduced, EF 40%. Moderate diffuse hypokinesis. LA mildly dilated.       Family History   Problem Relation Age of Onset    Arthritis Mother     High Blood Pressure Father     High Cholesterol Father     Thyroid Disease Brother     Schizophrenia Paternal Uncle     Diabetes Other         grandmother    Mental Illness Other         uncle    Heart Disease Other         grandfather, father had stroke     Social History     Tobacco Use    Smoking status: Every Day     Packs/day: 1.50     Years: 20.00     Pack years: 30.00     Types: Cigarettes    Smokeless tobacco: Never   Substance Use Topics    Alcohol use: No     Alcohol/week: 0.0 standard drinks      Current Outpatient Medications   Medication Sig Dispense Refill    neomycin-polymyxin-hydrocortisone (CORTISPORIN) 3.5-10875-4 otic solution Place 3 drops in ear(s) 3 times daily for 10 days 1 each 0    ATROVENT HFA 17 MCG/ACT inhaler inhale 2 puffs by mouth and INTO THE LUNGS three times a day 12.9 g 2    Continuous Blood Gluc Sensor (FREESTYLE ARIEL 14 DAY SENSOR) [facility] 1 SENSOR to back OF UPPER ARM REMOVE AND REPLACE every 14 days use with DEVICE to MONITOR BLOOD SUGAR 12 each 3    albuterol sulfate HFA (PROVENTIL;VENTOLIN;PROAIR) 108 (90 Base) MCG/ACT inhaler inhale 2 puffs by mouth and INTO THE LUNGS every 6 hours if needed for wheezing 36 g 3    mometasone-formoterol (DULERA) 200-5 MCG/ACT inhaler inhale 2 puffs by mouth and INTO THE LUNGS every 12 hours 13 g 5    vitamin D (ERGOCALCIFEROL) 1.25 MG (44826 UT) CAPS capsule take 1 capsule by mouth TWO TIMES PER WEEK 8 capsule 5    Continuous Blood Gluc  (FREESTYLE ARIEL 14 DAY READER) ISAIAH As directed.  Dx uncontrolled diabetes 4 each 2    Continuous Blood Gluc Sensor (FREESTYLE ARIEL SENSOR SYSTEM) [facility] [place] As directed 1 each 0    glucose monitoring kit (FREESTYLE) monitoring kit Dispense glucometer with test strips and lancets.  #100, check BS daily 1 kit 3    Respiratory Therapy Supplies ISAIAH cpap and supplies, dx GLORIA 1 Device 0    divalproex (DEPAKOTE) 250 MG DR tablet Take 1 tablet by mouth 3 times daily (Patient not taking: Reported on 3/2/2023) 90 tablet 3    albuterol (PROVENTIL) (2.5 MG/3ML) 0.083% nebulizer solution inhale contents of 1 vial in nebulizer every 6 hours if needed (Patient not taking: Reported on 3/2/2023) 75 mL 1    fluocinolone acetonide (SYNALAR) 0.01 % external solution apply to affected area twice a day (Patient not taking: Reported on 3/2/2023) 60 mL 2    NITROSTAT 0.4 MG SL tablet place 1 tablet under the tongue if needed every 5 minutes for chest pain for 3 doses IF NO RELIEF AFTER 3RD DOSE CALL PRESCRIBER . (Patient not taking: No sig reported) 25 tablet 0    Nebulizers (COMPRESSOR/NEBULIZER) MISC Nebulizer with tubing dx asthma (Patient not taking: Reported on 3/2/2023) 1 each 3    EPINEPHrine (EPIPEN) 0.3 MG/0.3ML ISAIAH injection Inject 0.3 mLs into the muscle once as needed for 1 dose. (Patient not taking: No sig reported) 1 Device 3     No current facility-administered medications for  this visit. Allergies   Allergen Reactions    Allopurinol      Sore throat      Olanzapine     Quetiapine     Seroquel [Quetiapine Fumarate]      Health Maintenance   Topic Date Due    Depression Monitoring  Never done    Hepatitis B vaccine (1 of 3 - Risk 3-dose series) Never done    DTaP/Tdap/Td vaccine (1 - Tdap) Never done    Colorectal Cancer Screen  12/21/2021    Flu vaccine (1) 08/01/2022    Lipids  10/01/2022    GFR test (Diabetes, CKD 3-4, OR last GFR 15-59)  10/01/2022    Diabetic foot exam  12/13/2022    Diabetic Alb to Cr ratio (uACR) test  12/13/2022    Diabetic retinal exam  05/18/2023    A1C test (Diabetic or Prediabetic)  03/02/2024    Pneumococcal 0-64 years Vaccine  Completed    COVID-19 Vaccine  Completed    Hepatitis C screen  Completed    Hepatitis A vaccine  Aged Out    Hib vaccine  Aged Out    Meningococcal (ACWY) vaccine  Aged Out    HIV screen  Discontinued       Subjective:     Review of Systems   Constitutional: Negative. HENT: Negative. Eyes: Negative. Respiratory: Negative. Cardiovascular: Negative. Gastrointestinal: Negative. Musculoskeletal: Negative. Skin: Negative. Neurological: Negative. Psychiatric/Behavioral:  The patient is nervous/anxious. Objective:     Vitals:    03/02/23 1108   BP: (!) 122/44   Site: Left Upper Arm   Position: Sitting   Cuff Size: Large Adult   Pulse: 78   Resp: 16   Temp: 97.7 °F (36.5 °C)   TempSrc: Temporal   SpO2: 95%   Weight: (!) 315 lb 9.6 oz (143.2 kg)   Height: 5' 10.08\" (1.78 m)       Physical Exam  Constitutional:       Appearance: He is well-developed. HENT:      Head: Normocephalic. Right Ear: Tympanic membrane and external ear normal.      Left Ear: Tympanic membrane and external ear normal.      Nose: Nose normal.   Cardiovascular:      Rate and Rhythm: Normal rate and regular rhythm. Heart sounds: Normal heart sounds. No murmur heard. No friction rub. No gallop.    Pulmonary:      Effort: Pulmonary effort is normal.      Breath sounds: Wheezing present. No rales. Abdominal:      General: Bowel sounds are normal.      Palpations: Abdomen is soft. Tenderness: There is no abdominal tenderness. There is no guarding. Musculoskeletal:         General: Normal range of motion. Cervical back: Normal range of motion and neck supple. Lymphadenopathy:      Cervical: No cervical adenopathy. Skin:     General: Skin is warm. Neurological:      Mental Status: He is alert and oriented to person, place, and time. Deep Tendon Reflexes: Reflexes are normal and symmetric. Psychiatric:         Mood and Affect: Mood is anxious. Thought Content: Thought content is not paranoid or delusional. Thought content does not include homicidal or suicidal ideation. Thought content does not include homicidal or suicidal plan. Assessment:      Diagnosis Orders   1. Type 2 diabetes mellitus without complication, without long-term current use of insulin (HCC)  Comprehensive Metabolic Panel    Lipid Panel    CBC with Auto Differential      2. Bipolar affective disorder, currently manic, moderate (HCC)  TSH with Reflex      3. Screening for prostate cancer  PSA Screening      4. Vitamin D deficiency  Vitamin D 25 Hydroxy          Plan:      No follow-ups on file. Will get labs today  Pt is on no meds  does not wish any  Will notify pt of test results when they are available.  If they are not notified they are to call office for the result    Orders Placed This Encounter   Procedures    Comprehensive Metabolic Panel     Standing Status:   Future     Number of Occurrences:   1     Standing Expiration Date:   3/2/2024    Lipid Panel     Standing Status:   Future     Number of Occurrences:   1     Standing Expiration Date:   3/2/2024    CBC with Auto Differential     Standing Status:   Future     Number of Occurrences:   1     Standing Expiration Date:   3/2/2024    TSH with Reflex     Standing Status:   Future     Number of Occurrences:   1     Standing Expiration Date:   3/2/2024    PSA Screening     Standing Status:   Future     Number of Occurrences:   1     Standing Expiration Date:   3/2/2024    Vitamin D 25 Hydroxy     Standing Status:   Future     Number of Occurrences:   1     Standing Expiration Date:   3/2/2024    POCT glycosylated hemoglobin (Hb A1C)     Orders Placed This Encounter   Medications    neomycin-polymyxin-hydrocortisone (CORTISPORIN) 3.5-24900-5 otic solution     Sig: Place 3 drops in ear(s) 3 times daily for 10 days     Dispense:  1 each     Refill:  0          Patient given educational materials - seepatient instructions. Discussed use, benefit, and side effects of prescribed medications. All patient questions answered. Pt voiced understanding. Patient agreed withtreatment plan. Follow up as directed.      Electronically signed by CARMEN Puri CNP on 3/2/2023 at 1:01 PM

## 2023-03-03 LAB
25(OH)D3 SERPL-MCNC: 21 NG/ML (ref 30–100)
ALBUMIN SERPL BCG-MCNC: 4.1 G/DL (ref 3.5–5.1)
ALP SERPL-CCNC: 90 U/L (ref 38–126)
ALT SERPL W/O P-5'-P-CCNC: 23 U/L (ref 11–66)
ANION GAP SERPL CALC-SCNC: 11 MEQ/L (ref 8–16)
AST SERPL-CCNC: 14 U/L (ref 5–40)
BASOPHILS ABSOLUTE: 0 THOU/MM3 (ref 0–0.1)
BASOPHILS NFR BLD AUTO: 0.3 %
BILIRUB SERPL-MCNC: 0.5 MG/DL (ref 0.3–1.2)
BUN SERPL-MCNC: 18 MG/DL (ref 7–22)
CALCIUM SERPL-MCNC: 9.4 MG/DL (ref 8.5–10.5)
CHLORIDE SERPL-SCNC: 100 MEQ/L (ref 98–111)
CHOLEST SERPL-MCNC: 171 MG/DL (ref 100–199)
CO2 SERPL-SCNC: 29 MEQ/L (ref 23–33)
CREAT SERPL-MCNC: 0.7 MG/DL (ref 0.4–1.2)
DEPRECATED RDW RBC AUTO: 50 FL (ref 35–45)
EOSINOPHIL NFR BLD AUTO: 0.8 %
EOSINOPHILS ABSOLUTE: 0.1 THOU/MM3 (ref 0–0.4)
ERYTHROCYTE [DISTWIDTH] IN BLOOD BY AUTOMATED COUNT: 14 % (ref 11.5–14.5)
GFR SERPL CREATININE-BSD FRML MDRD: > 60 ML/MIN/1.73M2
GLUCOSE SERPL-MCNC: 102 MG/DL (ref 70–108)
HCT VFR BLD AUTO: 53.2 % (ref 42–52)
HDLC SERPL-MCNC: 30 MG/DL
HGB BLD-MCNC: 17.6 GM/DL (ref 14–18)
IMM GRANULOCYTES # BLD AUTO: 0.03 THOU/MM3 (ref 0–0.07)
IMM GRANULOCYTES NFR BLD AUTO: 0.3 %
LDLC SERPL CALC-MCNC: 111 MG/DL
LYMPHOCYTES ABSOLUTE: 1.5 THOU/MM3 (ref 1–4.8)
LYMPHOCYTES NFR BLD AUTO: 12.3 %
MCH RBC QN AUTO: 32.1 PG (ref 26–33)
MCHC RBC AUTO-ENTMCNC: 33.1 GM/DL (ref 32.2–35.5)
MCV RBC AUTO: 97.1 FL (ref 80–94)
MONOCYTES ABSOLUTE: 0.8 THOU/MM3 (ref 0.4–1.3)
MONOCYTES NFR BLD AUTO: 6.5 %
NEUTROPHILS NFR BLD AUTO: 79.8 %
NRBC BLD AUTO-RTO: 0 /100 WBC
PLATELET # BLD AUTO: 260 THOU/MM3 (ref 130–400)
PMV BLD AUTO: 9.9 FL (ref 9.4–12.4)
POTASSIUM SERPL-SCNC: 4.5 MEQ/L (ref 3.5–5.2)
PROT SERPL-MCNC: 6.9 G/DL (ref 6.1–8)
PSA SERPL-MCNC: 0.53 NG/ML (ref 0–1)
RBC # BLD AUTO: 5.48 MILL/MM3 (ref 4.7–6.1)
SEGMENTED NEUTROPHILS ABSOLUTE COUNT: 9.5 THOU/MM3 (ref 1.8–7.7)
SODIUM SERPL-SCNC: 140 MEQ/L (ref 135–145)
TRIGL SERPL-MCNC: 149 MG/DL (ref 0–199)
TSH SERPL DL<=0.005 MIU/L-ACNC: 3.29 UIU/ML (ref 0.4–4.2)
WBC # BLD AUTO: 11.9 THOU/MM3 (ref 4.8–10.8)

## 2023-04-20 RX ORDER — IPRATROPIUM BROMIDE 17 UG/1
AEROSOL, METERED RESPIRATORY (INHALATION)
Qty: 12.9 G | Refills: 2 | Status: SHIPPED | OUTPATIENT
Start: 2023-04-20

## 2023-04-25 ENCOUNTER — TELEPHONE (OUTPATIENT)
Dept: FAMILY MEDICINE CLINIC | Age: 48
End: 2023-04-25

## 2023-04-25 ENCOUNTER — OFFICE VISIT (OUTPATIENT)
Dept: FAMILY MEDICINE CLINIC | Age: 48
End: 2023-04-25
Payer: COMMERCIAL

## 2023-04-25 VITALS
OXYGEN SATURATION: 97 % | WEIGHT: 306.4 LBS | SYSTOLIC BLOOD PRESSURE: 124 MMHG | RESPIRATION RATE: 16 BRPM | HEART RATE: 77 BPM | TEMPERATURE: 97.4 F | DIASTOLIC BLOOD PRESSURE: 70 MMHG | BODY MASS INDEX: 43.86 KG/M2 | HEIGHT: 70 IN

## 2023-04-25 DIAGNOSIS — G47.33 OSA (OBSTRUCTIVE SLEEP APNEA): ICD-10-CM

## 2023-04-25 DIAGNOSIS — E29.1 HYPOGONADISM MALE: Primary | ICD-10-CM

## 2023-04-25 PROCEDURE — 99213 OFFICE O/P EST LOW 20 MIN: CPT | Performed by: NURSE PRACTITIONER

## 2023-04-25 ASSESSMENT — ENCOUNTER SYMPTOMS
EYES NEGATIVE: 1
GASTROINTESTINAL NEGATIVE: 1
RESPIRATORY NEGATIVE: 1

## 2023-04-25 NOTE — TELEPHONE ENCOUNTER
Received call from [facility]. New order is needed faxed with specific setting along with progress note. Original order faxed only stated to set at previous setting.      Fax: [phone]

## 2023-04-26 NOTE — TELEPHONE ENCOUNTER
Pt stated he is seeing someone today for his testosterone replacement therapy and they've taken care of it before. He will contact them.

## 2023-08-02 DIAGNOSIS — J44.9 CHRONIC OBSTRUCTIVE PULMONARY DISEASE, UNSPECIFIED COPD TYPE (HCC): ICD-10-CM

## 2023-09-26 DIAGNOSIS — J44.9 CHRONIC OBSTRUCTIVE PULMONARY DISEASE, UNSPECIFIED COPD TYPE (HCC): ICD-10-CM

## 2023-09-26 RX ORDER — ALBUTEROL SULFATE 90 UG/1
AEROSOL, METERED RESPIRATORY (INHALATION)
Qty: 36 G | Refills: 3 | Status: SHIPPED | OUTPATIENT
Start: 2023-09-26

## 2024-04-08 DIAGNOSIS — J44.9 CHRONIC OBSTRUCTIVE PULMONARY DISEASE, UNSPECIFIED COPD TYPE (HCC): ICD-10-CM

## 2024-04-08 RX ORDER — ALBUTEROL SULFATE 90 UG/1
AEROSOL, METERED RESPIRATORY (INHALATION)
Qty: 17 G | Refills: 3 | Status: SHIPPED | OUTPATIENT
Start: 2024-04-08

## 2024-04-12 DIAGNOSIS — J44.9 CHRONIC OBSTRUCTIVE PULMONARY DISEASE, UNSPECIFIED COPD TYPE (HCC): ICD-10-CM

## 2024-04-15 RX ORDER — ALBUTEROL SULFATE 90 UG/1
AEROSOL, METERED RESPIRATORY (INHALATION)
Qty: 17 G | Refills: 0 | Status: SHIPPED | OUTPATIENT
Start: 2024-04-15

## 2024-04-30 ASSESSMENT — PATIENT HEALTH QUESTIONNAIRE - PHQ9
7. TROUBLE CONCENTRATING ON THINGS, SUCH AS READING THE NEWSPAPER OR WATCHING TELEVISION: NEARLY EVERY DAY
3. TROUBLE FALLING OR STAYING ASLEEP: NEARLY EVERY DAY
SUM OF ALL RESPONSES TO PHQ QUESTIONS 1-9: 18
5. POOR APPETITE OR OVEREATING: NEARLY EVERY DAY
9. THOUGHTS THAT YOU WOULD BE BETTER OFF DEAD, OR OF HURTING YOURSELF: NOT AT ALL
1. LITTLE INTEREST OR PLEASURE IN DOING THINGS: NEARLY EVERY DAY
SUM OF ALL RESPONSES TO PHQ9 QUESTIONS 1 & 2: 5
1. LITTLE INTEREST OR PLEASURE IN DOING THINGS: NEARLY EVERY DAY
SUM OF ALL RESPONSES TO PHQ QUESTIONS 1-9: 18
8. MOVING OR SPEAKING SO SLOWLY THAT OTHER PEOPLE COULD HAVE NOTICED. OR THE OPPOSITE - BEING SO FIDGETY OR RESTLESS THAT YOU HAVE BEEN MOVING AROUND A LOT MORE THAN USUAL: SEVERAL DAYS
5. POOR APPETITE OR OVEREATING: NEARLY EVERY DAY
SUM OF ALL RESPONSES TO PHQ QUESTIONS 1-9: 18
SUM OF ALL RESPONSES TO PHQ QUESTIONS 1-9: 18
4. FEELING TIRED OR HAVING LITTLE ENERGY: NEARLY EVERY DAY
8. MOVING OR SPEAKING SO SLOWLY THAT OTHER PEOPLE COULD HAVE NOTICED. OR THE OPPOSITE, BEING SO FIGETY OR RESTLESS THAT YOU HAVE BEEN MOVING AROUND A LOT MORE THAN USUAL: SEVERAL DAYS
4. FEELING TIRED OR HAVING LITTLE ENERGY: NEARLY EVERY DAY
2. FEELING DOWN, DEPRESSED OR HOPELESS: MORE THAN HALF THE DAYS
10. IF YOU CHECKED OFF ANY PROBLEMS, HOW DIFFICULT HAVE THESE PROBLEMS MADE IT FOR YOU TO DO YOUR WORK, TAKE CARE OF THINGS AT HOME, OR GET ALONG WITH OTHER PEOPLE: VERY DIFFICULT
SUM OF ALL RESPONSES TO PHQ QUESTIONS 1-9: 18
6. FEELING BAD ABOUT YOURSELF - OR THAT YOU ARE A FAILURE OR HAVE LET YOURSELF OR YOUR FAMILY DOWN: NOT AT ALL
2. FEELING DOWN, DEPRESSED OR HOPELESS: MORE THAN HALF THE DAYS
9. THOUGHTS THAT YOU WOULD BE BETTER OFF DEAD, OR OF HURTING YOURSELF: NOT AT ALL
3. TROUBLE FALLING OR STAYING ASLEEP: NEARLY EVERY DAY
7. TROUBLE CONCENTRATING ON THINGS, SUCH AS READING THE NEWSPAPER OR WATCHING TELEVISION: NEARLY EVERY DAY
10. IF YOU CHECKED OFF ANY PROBLEMS, HOW DIFFICULT HAVE THESE PROBLEMS MADE IT FOR YOU TO DO YOUR WORK, TAKE CARE OF THINGS AT HOME, OR GET ALONG WITH OTHER PEOPLE: VERY DIFFICULT
6. FEELING BAD ABOUT YOURSELF - OR THAT YOU ARE A FAILURE OR HAVE LET YOURSELF OR YOUR FAMILY DOWN: NOT AT ALL

## 2024-05-01 ENCOUNTER — OFFICE VISIT (OUTPATIENT)
Dept: FAMILY MEDICINE CLINIC | Age: 49
End: 2024-05-01

## 2024-05-01 VITALS
DIASTOLIC BLOOD PRESSURE: 88 MMHG | HEART RATE: 80 BPM | WEIGHT: 315 LBS | BODY MASS INDEX: 45.1 KG/M2 | RESPIRATION RATE: 18 BRPM | OXYGEN SATURATION: 95 % | HEIGHT: 70 IN | TEMPERATURE: 97.8 F | SYSTOLIC BLOOD PRESSURE: 138 MMHG

## 2024-05-01 DIAGNOSIS — G47.33 OSA (OBSTRUCTIVE SLEEP APNEA): ICD-10-CM

## 2024-05-01 DIAGNOSIS — F90.2 ATTENTION DEFICIT HYPERACTIVITY DISORDER (ADHD), COMBINED TYPE: ICD-10-CM

## 2024-05-01 DIAGNOSIS — Z12.5 SCREENING FOR PROSTATE CANCER: ICD-10-CM

## 2024-05-01 DIAGNOSIS — H69.91 DYSFUNCTION OF RIGHT EUSTACHIAN TUBE: ICD-10-CM

## 2024-05-01 DIAGNOSIS — F31.12 BIPOLAR AFFECTIVE DISORDER, CURRENTLY MANIC, MODERATE (HCC): ICD-10-CM

## 2024-05-01 DIAGNOSIS — J44.9 CHRONIC OBSTRUCTIVE PULMONARY DISEASE, UNSPECIFIED COPD TYPE (HCC): ICD-10-CM

## 2024-05-01 DIAGNOSIS — E11.9 TYPE 2 DIABETES MELLITUS WITHOUT COMPLICATION, WITHOUT LONG-TERM CURRENT USE OF INSULIN (HCC): ICD-10-CM

## 2024-05-01 DIAGNOSIS — R19.7 DIARRHEA, UNSPECIFIED TYPE: ICD-10-CM

## 2024-05-01 DIAGNOSIS — Z23 NEED FOR PNEUMOCOCCAL 20-VALENT CONJUGATE VACCINATION: ICD-10-CM

## 2024-05-01 DIAGNOSIS — Z00.00 ROUTINE PHYSICAL EXAMINATION: Primary | ICD-10-CM

## 2024-05-01 DIAGNOSIS — Z87.891 PERSONAL HISTORY OF TOBACCO USE: ICD-10-CM

## 2024-05-01 DIAGNOSIS — Z12.11 SCREEN FOR COLON CANCER: ICD-10-CM

## 2024-05-01 LAB — HBA1C MFR BLD: 6.1 %

## 2024-05-01 RX ORDER — ATOMOXETINE 40 MG/1
CAPSULE ORAL
Qty: 60 CAPSULE | Refills: 3 | Status: SHIPPED | OUTPATIENT
Start: 2024-05-01

## 2024-05-01 RX ORDER — ALBUTEROL SULFATE 90 UG/1
AEROSOL, METERED RESPIRATORY (INHALATION)
Qty: 17 G | Refills: 3 | Status: SHIPPED | OUTPATIENT
Start: 2024-05-01

## 2024-05-01 SDOH — ECONOMIC STABILITY: FOOD INSECURITY: WITHIN THE PAST 12 MONTHS, YOU WORRIED THAT YOUR FOOD WOULD RUN OUT BEFORE YOU GOT MONEY TO BUY MORE.: NEVER TRUE

## 2024-05-01 SDOH — ECONOMIC STABILITY: FOOD INSECURITY: WITHIN THE PAST 12 MONTHS, THE FOOD YOU BOUGHT JUST DIDN'T LAST AND YOU DIDN'T HAVE MONEY TO GET MORE.: NEVER TRUE

## 2024-05-01 SDOH — ECONOMIC STABILITY: INCOME INSECURITY: HOW HARD IS IT FOR YOU TO PAY FOR THE VERY BASICS LIKE FOOD, HOUSING, MEDICAL CARE, AND HEATING?: NOT HARD AT ALL

## 2024-05-01 ASSESSMENT — ENCOUNTER SYMPTOMS
ABDOMINAL DISTENTION: 1
EYES NEGATIVE: 1
RESPIRATORY NEGATIVE: 1
NAUSEA: 1
DIARRHEA: 1

## 2024-05-01 NOTE — PROGRESS NOTES
Benjy Marquez  1975    Are you sick today? no  Do you have allergies to medications, food, a vaccine component, or latex? no  Have you ever had a serious reaction after receiving a vaccination? no  Do you have a long-term health problem with heart, lung, kidney, or metabolic disease (e.g. diabetes), asthma, a blood disorder, no spleen, complement component deficiency, a cochlear implant, or a spinal fluid leak? Are you on long-term aspirin therapy? no  Do you have cancer, leukemia, HIV/AIDS, or any other immune system problem? no  Do you have a parent, brother, or sister with an immune system problem? no  In the past 3 months, have you taken medications that affect your immune system, such as prednisone, other steroids, or anticancer drugs; drugs for the treatment of rheumatoid arthritis, Crohn's disease, or psoriasis; or have you had radiation treatment? no  Have you had a seizure or a brain or other nervous system problem? no  During the past year, have you received a transfusion of blood or blood products, or been given immune (gamma) globulin or an antiviral drug? no  For women: Are you pregnant or is there a chance you could become pregnant during the next month? no  Have you received any vaccinations in the past 4 weeks? no    Form Completed by: Yvonne Hauser CMA on 5/1/2024 at 3:13 PM EDT  Form Reviewed by: Yvonne Hauser MA on 5/1/2024 at 3:13 PM EDT    Did you bring your immunization card with you? No  Immunizations Administered       Name Date Dose Route    Pneumococcal, PCV20, PREVNAR 20, (age 6w+), IM, 0.5mL 5/1/2024 0.5 mL Intramuscular    Site: Deltoid- Left    Lot: [id number]    NDC: 8987-1730-66            Patient tolerated well.    
with tubing dx asthma 1 each 3    EPINEPHrine (EPIPEN) 0.3 MG/0.3ML ISAIAH injection Inject 0.3 mLs into the muscle once as needed for 1 dose. 1 Device 3     No current facility-administered medications for this visit.     Allergies   Allergen Reactions    Allopurinol      Sore throat      Olanzapine     Quetiapine     Seroquel [Quetiapine Fumarate]      Health Maintenance   Topic Date Due    Hepatitis B vaccine (1 of 3 - 3-dose series) Never done    DTaP/Tdap/Td vaccine (1 - Tdap) Never done    Colorectal Cancer Screen  12/21/2021    Diabetic foot exam  12/13/2022    Diabetic Alb to Cr ratio (uACR) test  12/13/2022    Shingles vaccine (1 of 2) Never done    Low dose CT lung screening &/or counseling  Never done    Diabetic retinal exam  05/18/2023    COVID-19 Vaccine (5 - 2023-24 season) 09/01/2023    Lipids  03/02/2024    GFR test (Diabetes, CKD 3-4, OR last GFR 15-59)  03/02/2024    Flu vaccine (Season Ended) 08/01/2024    Depression Monitoring  04/30/2025    A1C test (Diabetic or Prediabetic)  05/01/2025    Pneumococcal 0-64 years Vaccine  Completed    Hepatitis C screen  Completed    Hepatitis A vaccine  Aged Out    Hib vaccine  Aged Out    Polio vaccine  Aged Out    Meningococcal (ACWY) vaccine  Aged Out    Depression Screen  Discontinued    HIV screen  Discontinued       :     Review of Systems   Constitutional: Negative.    HENT:  Positive for congestion and ear pain.    Eyes: Negative.    Respiratory: Negative.     Cardiovascular: Negative.    Gastrointestinal:  Positive for abdominal distention, diarrhea and nausea.   Musculoskeletal: Negative.    Skin: Negative.    Neurological: Negative.    Psychiatric/Behavioral:  Positive for decreased concentration.        Objective:     Vitals:    05/01/24 1417   BP: 138/88   Site: Left Upper Arm   Position: Sitting   Cuff Size: Large Adult   Pulse: 80   Resp: 18   Temp: 97.8 °F (36.6 °C)   TempSrc: Temporal   SpO2: 95%   Weight: (!) 160.6 kg (354 lb)   Height: 1.78 m

## 2024-05-10 RX ORDER — IPRATROPIUM BROMIDE 17 UG/1
AEROSOL, METERED RESPIRATORY (INHALATION)
Qty: 12.9 G | Refills: 2 | Status: SHIPPED | OUTPATIENT
Start: 2024-05-10

## 2024-06-20 DIAGNOSIS — J44.9 CHRONIC OBSTRUCTIVE PULMONARY DISEASE, UNSPECIFIED COPD TYPE (HCC): ICD-10-CM

## 2024-06-20 NOTE — TELEPHONE ENCOUNTER
Last visit- 5/1/2024  Next visit- Visit date not found    Requested Prescriptions     Pending Prescriptions Disp Refills    mometasone-formoterol (DULERA) 200-5 MCG/ACT inhaler [Pharmacy Med Name: DULERA 200 MCG-5 MCG INHALER] 13 g 5     Sig: inhale 2 puffs by mouth and INTO THE LUNGS every 12 hours

## 2024-08-03 DIAGNOSIS — J44.9 CHRONIC OBSTRUCTIVE PULMONARY DISEASE, UNSPECIFIED COPD TYPE (HCC): ICD-10-CM

## 2024-08-05 RX ORDER — ALBUTEROL SULFATE 90 UG/1
AEROSOL, METERED RESPIRATORY (INHALATION)
Qty: 17 G | Refills: 3 | Status: SHIPPED | OUTPATIENT
Start: 2024-08-05

## 2024-11-01 RX ORDER — ALBUTEROL SULFATE 0.83 MG/ML
2.5 SOLUTION RESPIRATORY (INHALATION) EVERY 6 HOURS PRN
Qty: 75 ML | Refills: 1 | Status: SHIPPED | OUTPATIENT
Start: 2024-11-01

## 2024-11-01 NOTE — TELEPHONE ENCOUNTER
Last visit- 5/1/2024  Next visit- Visit date not found    Requested Prescriptions     Pending Prescriptions Disp Refills    albuterol (PROVENTIL) (2.5 MG/3ML) 0.083% nebulizer solution 75 mL 1

## 2024-11-04 DIAGNOSIS — J44.9 CHRONIC OBSTRUCTIVE PULMONARY DISEASE, UNSPECIFIED COPD TYPE (HCC): ICD-10-CM

## 2024-11-04 RX ORDER — ALBUTEROL SULFATE 90 UG/1
INHALANT RESPIRATORY (INHALATION)
Qty: 17 G | Refills: 3 | Status: SHIPPED | OUTPATIENT
Start: 2024-11-04

## 2024-11-04 NOTE — TELEPHONE ENCOUNTER
Last visit- 5/1/2024  Next visit- Visit date not found    Requested Prescriptions     Pending Prescriptions Disp Refills    albuterol sulfate HFA (PROVENTIL;VENTOLIN;PROAIR) 108 (90 Base) MCG/ACT inhaler 17 g 3     Sig: inhale 2 puff by mouth every 6 hours if needed for wheezing

## 2024-12-31 RX ORDER — ALBUTEROL SULFATE 0.83 MG/ML
2.5 SOLUTION RESPIRATORY (INHALATION) EVERY 6 HOURS PRN
Qty: 75 ML | Refills: 1 | Status: SHIPPED | OUTPATIENT
Start: 2024-12-31

## 2024-12-31 NOTE — TELEPHONE ENCOUNTER
Last visit- 5/1/2024  Next visit- Visit date not found    Requested Prescriptions     Pending Prescriptions Disp Refills    albuterol (PROVENTIL) (2.5 MG/3ML) 0.083% nebulizer solution 75 mL 1     Sig: Take 3 mLs by nebulization every 6 hours as needed for Wheezing

## 2025-01-23 DIAGNOSIS — J44.9 CHRONIC OBSTRUCTIVE PULMONARY DISEASE, UNSPECIFIED COPD TYPE (HCC): ICD-10-CM

## 2025-02-17 DIAGNOSIS — J44.9 CHRONIC OBSTRUCTIVE PULMONARY DISEASE, UNSPECIFIED COPD TYPE (HCC): ICD-10-CM

## 2025-02-17 RX ORDER — ALBUTEROL SULFATE 0.83 MG/ML
2.5 SOLUTION RESPIRATORY (INHALATION) EVERY 6 HOURS PRN
Qty: 75 ML | Refills: 1 | Status: SHIPPED | OUTPATIENT
Start: 2025-02-17

## 2025-03-04 DIAGNOSIS — J44.9 CHRONIC OBSTRUCTIVE PULMONARY DISEASE, UNSPECIFIED COPD TYPE (HCC): ICD-10-CM

## 2025-03-04 RX ORDER — ALBUTEROL SULFATE 90 UG/1
INHALANT RESPIRATORY (INHALATION)
Qty: 17 G | Refills: 1 | Status: SHIPPED | OUTPATIENT
Start: 2025-03-04

## 2025-05-05 DIAGNOSIS — J44.9 CHRONIC OBSTRUCTIVE PULMONARY DISEASE, UNSPECIFIED COPD TYPE (HCC): ICD-10-CM

## 2025-05-05 RX ORDER — ALBUTEROL SULFATE 90 UG/1
INHALANT RESPIRATORY (INHALATION)
Qty: 17 G | Refills: 1 | Status: SHIPPED | OUTPATIENT
Start: 2025-05-05

## 2025-05-19 DIAGNOSIS — J44.9 CHRONIC OBSTRUCTIVE PULMONARY DISEASE, UNSPECIFIED COPD TYPE (HCC): ICD-10-CM

## 2025-05-19 NOTE — TELEPHONE ENCOUNTER
Last visit- 5/1/2024  Next visit- 5/27/2025    Requested Prescriptions     Pending Prescriptions Disp Refills    mometasone-formoterol (DULERA) 200-5 MCG/ACT inhaler 2 each 1     Sig: inhale 2 puffs by mouth and INTO THE LUNGS every 12 hours

## 2025-05-27 ENCOUNTER — OFFICE VISIT (OUTPATIENT)
Dept: FAMILY MEDICINE CLINIC | Age: 50
End: 2025-05-27
Payer: COMMERCIAL

## 2025-05-27 VITALS
HEIGHT: 70 IN | DIASTOLIC BLOOD PRESSURE: 82 MMHG | BODY MASS INDEX: 45.1 KG/M2 | TEMPERATURE: 97.5 F | SYSTOLIC BLOOD PRESSURE: 134 MMHG | OXYGEN SATURATION: 95 % | RESPIRATION RATE: 18 BRPM | WEIGHT: 315 LBS | HEART RATE: 87 BPM

## 2025-05-27 DIAGNOSIS — F90.2 ATTENTION DEFICIT HYPERACTIVITY DISORDER (ADHD), COMBINED TYPE: ICD-10-CM

## 2025-05-27 DIAGNOSIS — Z12.11 SCREEN FOR COLON CANCER: ICD-10-CM

## 2025-05-27 DIAGNOSIS — E11.9 TYPE 2 DIABETES MELLITUS WITHOUT COMPLICATION, WITHOUT LONG-TERM CURRENT USE OF INSULIN (HCC): ICD-10-CM

## 2025-05-27 DIAGNOSIS — Z12.5 SCREENING FOR PROSTATE CANCER: ICD-10-CM

## 2025-05-27 DIAGNOSIS — Z00.00 ROUTINE PHYSICAL EXAMINATION: Primary | ICD-10-CM

## 2025-05-27 DIAGNOSIS — F31.12 BIPOLAR AFFECTIVE DISORDER, CURRENTLY MANIC, MODERATE (HCC): ICD-10-CM

## 2025-05-27 DIAGNOSIS — J44.9 CHRONIC OBSTRUCTIVE PULMONARY DISEASE, UNSPECIFIED COPD TYPE (HCC): ICD-10-CM

## 2025-05-27 LAB
ALBUMIN SERPL BCG-MCNC: 3.9 G/DL (ref 3.4–4.9)
ALP SERPL-CCNC: 91 U/L (ref 40–129)
ALT SERPL W/O P-5'-P-CCNC: 31 U/L (ref 10–50)
ANION GAP SERPL CALC-SCNC: 12 MEQ/L (ref 8–16)
AST SERPL-CCNC: 37 U/L (ref 10–50)
BASOPHILS ABSOLUTE: 0.1 THOU/MM3 (ref 0–0.1)
BASOPHILS NFR BLD AUTO: 0.5 %
BILIRUB SERPL-MCNC: 0.4 MG/DL (ref 0.3–1.2)
BUN SERPL-MCNC: 13 MG/DL (ref 8–23)
CALCIUM SERPL-MCNC: 9.8 MG/DL (ref 8.6–10)
CHLORIDE SERPL-SCNC: 102 MEQ/L (ref 98–111)
CHOLEST SERPL-MCNC: 187 MG/DL (ref 100–199)
CO2 SERPL-SCNC: 21 MEQ/L (ref 22–29)
CREAT SERPL-MCNC: 0.8 MG/DL (ref 0.7–1.2)
CREAT UR-MCNC: 474 MG/DL
DEPRECATED RDW RBC AUTO: 45.1 FL (ref 35–45)
EOSINOPHIL NFR BLD AUTO: 1.1 %
EOSINOPHILS ABSOLUTE: 0.1 THOU/MM3 (ref 0–0.4)
ERYTHROCYTE [DISTWIDTH] IN BLOOD BY AUTOMATED COUNT: 13.2 % (ref 11.5–14.5)
GFR SERPL CREATININE-BSD FRML MDRD: > 90 ML/MIN/1.73M2
GLUCOSE SERPL-MCNC: 159 MG/DL (ref 74–109)
HBA1C MFR BLD: 8.1 %
HCT VFR BLD AUTO: 52.5 % (ref 42–52)
HDLC SERPL-MCNC: 28 MG/DL
HGB BLD-MCNC: 17.5 GM/DL (ref 14–18)
IMM GRANULOCYTES # BLD AUTO: 0.05 THOU/MM3 (ref 0–0.07)
IMM GRANULOCYTES NFR BLD AUTO: 0.4 %
LDLC SERPL CALC-MCNC: 127 MG/DL
LYMPHOCYTES ABSOLUTE: 1.9 THOU/MM3 (ref 1–4.8)
LYMPHOCYTES NFR BLD AUTO: 15.2 %
MCH RBC QN AUTO: 31.1 PG (ref 26–33)
MCHC RBC AUTO-ENTMCNC: 33.3 GM/DL (ref 32.2–35.5)
MCV RBC AUTO: 93.4 FL (ref 80–94)
MICROALBUMIN UR-MCNC: 19 MG/DL
MICROALBUMIN/CREAT RATIO PNL UR: 40 MG/G (ref 0–30)
MONOCYTES ABSOLUTE: 0.8 THOU/MM3 (ref 0.4–1.3)
MONOCYTES NFR BLD AUTO: 6 %
NEUTROPHILS ABSOLUTE: 9.6 THOU/MM3 (ref 1.8–7.7)
NEUTROPHILS NFR BLD AUTO: 76.8 %
NRBC BLD AUTO-RTO: 0 /100 WBC
PLATELET # BLD AUTO: 259 THOU/MM3 (ref 130–400)
PMV BLD AUTO: 10.1 FL (ref 9.4–12.4)
POTASSIUM SERPL-SCNC: 5.3 MEQ/L (ref 3.5–5.2)
PROT SERPL-MCNC: 7.2 G/DL (ref 6.4–8.3)
PSA SERPL-MCNC: 0.31 NG/ML (ref 0–1)
RBC # BLD AUTO: 5.62 MILL/MM3 (ref 4.7–6.1)
SODIUM SERPL-SCNC: 135 MEQ/L (ref 135–145)
TRIGL SERPL-MCNC: 159 MG/DL (ref 0–199)
WBC # BLD AUTO: 12.5 THOU/MM3 (ref 4.8–10.8)

## 2025-05-27 PROCEDURE — 83036 HEMOGLOBIN GLYCOSYLATED A1C: CPT | Performed by: NURSE PRACTITIONER

## 2025-05-27 PROCEDURE — 99396 PREV VISIT EST AGE 40-64: CPT | Performed by: NURSE PRACTITIONER

## 2025-05-27 RX ORDER — FLUTICASONE FUROATE, UMECLIDINIUM BROMIDE AND VILANTEROL TRIFENATATE 200; 62.5; 25 UG/1; UG/1; UG/1
1 POWDER RESPIRATORY (INHALATION) DAILY
Qty: 3 EACH | Refills: 5 | Status: SHIPPED | OUTPATIENT
Start: 2025-05-27

## 2025-05-27 SDOH — ECONOMIC STABILITY: FOOD INSECURITY: WITHIN THE PAST 12 MONTHS, THE FOOD YOU BOUGHT JUST DIDN'T LAST AND YOU DIDN'T HAVE MONEY TO GET MORE.: NEVER TRUE

## 2025-05-27 SDOH — ECONOMIC STABILITY: FOOD INSECURITY: WITHIN THE PAST 12 MONTHS, YOU WORRIED THAT YOUR FOOD WOULD RUN OUT BEFORE YOU GOT MONEY TO BUY MORE.: NEVER TRUE

## 2025-05-27 ASSESSMENT — ENCOUNTER SYMPTOMS
GASTROINTESTINAL NEGATIVE: 1
RESPIRATORY NEGATIVE: 1
EYES NEGATIVE: 1

## 2025-05-27 ASSESSMENT — PATIENT HEALTH QUESTIONNAIRE - PHQ9
SUM OF ALL RESPONSES TO PHQ QUESTIONS 1-9: 0
SUM OF ALL RESPONSES TO PHQ QUESTIONS 1-9: 0
6. FEELING BAD ABOUT YOURSELF - OR THAT YOU ARE A FAILURE OR HAVE LET YOURSELF OR YOUR FAMILY DOWN: NOT AT ALL
4. FEELING TIRED OR HAVING LITTLE ENERGY: NOT AT ALL
7. TROUBLE CONCENTRATING ON THINGS, SUCH AS READING THE NEWSPAPER OR WATCHING TELEVISION: NOT AT ALL
10. IF YOU CHECKED OFF ANY PROBLEMS, HOW DIFFICULT HAVE THESE PROBLEMS MADE IT FOR YOU TO DO YOUR WORK, TAKE CARE OF THINGS AT HOME, OR GET ALONG WITH OTHER PEOPLE: NOT DIFFICULT AT ALL
1. LITTLE INTEREST OR PLEASURE IN DOING THINGS: NOT AT ALL
SUM OF ALL RESPONSES TO PHQ QUESTIONS 1-9: 0
SUM OF ALL RESPONSES TO PHQ QUESTIONS 1-9: 0
3. TROUBLE FALLING OR STAYING ASLEEP: NOT AT ALL
2. FEELING DOWN, DEPRESSED OR HOPELESS: NOT AT ALL
5. POOR APPETITE OR OVEREATING: NOT AT ALL
8. MOVING OR SPEAKING SO SLOWLY THAT OTHER PEOPLE COULD HAVE NOTICED. OR THE OPPOSITE, BEING SO FIGETY OR RESTLESS THAT YOU HAVE BEEN MOVING AROUND A LOT MORE THAN USUAL: NOT AT ALL
9. THOUGHTS THAT YOU WOULD BE BETTER OFF DEAD, OR OF HURTING YOURSELF: NOT AT ALL

## 2025-05-27 NOTE — PROGRESS NOTES
Blood draw left hand.  Patient tolerated well   
every 6 hours as needed for Wheezing 75 mL 1    vitamin D (ERGOCALCIFEROL) 1.25 MG (75759 UT) CAPS capsule take 1 capsule by mouth TWO TIMES PER WEEK 8 capsule 5    glucose monitoring kit (FREESTYLE) monitoring kit Dispense glucometer with test strips and lancets.  #100, check BS daily 1 kit 3    NITROSTAT 0.4 MG SL tablet place 1 tablet under the tongue if needed every 5 minutes for chest pain for 3 doses IF NO RELIEF AFTER 3RD DOSE CALL PRESCRIBER . 25 tablet 0    Respiratory Therapy Supplies ISAIAH cpap and supplies, dx GLORIA 1 Device 0    Nebulizers (COMPRESSOR/NEBULIZER) MISC Nebulizer with tubing dx asthma 1 each 3    EPINEPHrine (EPIPEN) 0.3 MG/0.3ML ISAIAH injection Inject 0.3 mLs into the muscle once as needed for 1 dose. 1 Device 3    Continuous Blood Gluc Sensor (FREESTYLE ARIEL 14 DAY SENSOR) [facility] [place] apply 1 SENSOR to back OF UPPER ARM REMOVE AND REPLACE every 14 days use with DEVICE to MONITOR BLOOD SUGAR (Patient not taking: Reported on 5/27/2025) 12 each 3    Continuous Blood Gluc  (FREESTYLE ARIEL 14 DAY READER) ISAIAH As directed.  Dx uncontrolled diabetes (Patient not taking: Reported on 5/27/2025) 4 each 2    Continuous Blood Gluc Sensor (FREESTYLE ARIEL SENSOR SYSTEM) [facility] [place] As directed (Patient not taking: Reported on 5/27/2025) 1 each 0     No current facility-administered medications for this visit.     Allergies   Allergen Reactions    Allopurinol      Sore throat      Olanzapine     Quetiapine     Seroquel [Quetiapine Fumarate]      Health Maintenance   Topic Date Due    Hepatitis B vaccine (1 of 3 - 19+ 3-dose series) Never done    DTaP/Tdap/Td vaccine (1 - Tdap) Never done    Diabetic Alb to Cr ratio (uACR) test  02/12/2021    Colorectal Cancer Screen  12/21/2021    Diabetic foot exam  12/13/2022    Shingles vaccine (1 of 2) Never done    Lung Cancer Screening &/or Counseling  Never done    Diabetic retinal exam  05/18/2023    Lipids  03/02/2024    GFR test (Diabetes, CKD 3-4, OR last GFR

## 2025-05-28 ENCOUNTER — RESULTS FOLLOW-UP (OUTPATIENT)
Dept: FAMILY MEDICINE CLINIC | Age: 50
End: 2025-05-28

## 2025-05-28 NOTE — TELEPHONE ENCOUNTER
Please call pt.  His labs had a few abnormalities.  1) his urine did show some protein.  Which indicates some kidney damage from  his uncontrolled sugar.   Hopefully the mounjaro can help that recover.  Also his cholesterol is too high for a person with diabetes.  Recommend to start a cholesterol medication.  Is he ok with starting a cholesterol medication

## 2025-06-04 ENCOUNTER — PATIENT MESSAGE (OUTPATIENT)
Dept: FAMILY MEDICINE CLINIC | Age: 50
End: 2025-06-04

## 2025-06-16 ENCOUNTER — PATIENT MESSAGE (OUTPATIENT)
Dept: FAMILY MEDICINE CLINIC | Age: 50
End: 2025-06-16

## 2025-06-16 DIAGNOSIS — J44.9 CHRONIC OBSTRUCTIVE PULMONARY DISEASE, UNSPECIFIED COPD TYPE (HCC): ICD-10-CM

## 2025-06-27 ENCOUNTER — PATIENT MESSAGE (OUTPATIENT)
Dept: FAMILY MEDICINE CLINIC | Age: 50
End: 2025-06-27

## 2025-06-27 DIAGNOSIS — J44.9 CHRONIC OBSTRUCTIVE PULMONARY DISEASE, UNSPECIFIED COPD TYPE (HCC): ICD-10-CM

## 2025-07-02 ENCOUNTER — TELEPHONE (OUTPATIENT)
Dept: FAMILY MEDICINE CLINIC | Age: 50
End: 2025-07-02

## 2025-07-02 NOTE — TELEPHONE ENCOUNTER
Vinnie    [facility]  Insurance response time is 7-14 business days.

## 2025-07-07 ENCOUNTER — PATIENT MESSAGE (OUTPATIENT)
Dept: FAMILY MEDICINE CLINIC | Age: 50
End: 2025-07-07

## 2025-07-07 DIAGNOSIS — E11.9 TYPE 2 DIABETES MELLITUS WITHOUT COMPLICATION, WITHOUT LONG-TERM CURRENT USE OF INSULIN (HCC): ICD-10-CM

## 2025-07-07 NOTE — TELEPHONE ENCOUNTER
Last visit- 5/27/2025  Next visit- 8/27/2025    Requested Prescriptions     Pending Prescriptions Disp Refills    Tirzepatide (MOUNJARO) 2.5 MG/0.5ML SOAJ pen 2 mL 0     Sig: Inject 2.5 mg into the skin every 7 days

## 2025-07-08 ENCOUNTER — TELEPHONE (OUTPATIENT)
Dept: FAMILY MEDICINE CLINIC | Age: 50
End: 2025-07-08

## 2025-07-08 NOTE — TELEPHONE ENCOUNTER
Mounjaro    An active PA is already on file with expiration date of [id number]. Please wait to resubmit request within 60 days of that expiration date to obtain a PA renewal.

## 2025-07-23 ENCOUNTER — LAB (OUTPATIENT)
Dept: LAB | Age: 50
End: 2025-07-23

## 2025-07-23 ENCOUNTER — OFFICE VISIT (OUTPATIENT)
Dept: FAMILY MEDICINE CLINIC | Age: 50
End: 2025-07-23
Payer: COMMERCIAL

## 2025-07-23 VITALS
SYSTOLIC BLOOD PRESSURE: 120 MMHG | HEART RATE: 79 BPM | BODY MASS INDEX: 52.11 KG/M2 | DIASTOLIC BLOOD PRESSURE: 78 MMHG | WEIGHT: 315 LBS | TEMPERATURE: 97.7 F | RESPIRATION RATE: 16 BRPM

## 2025-07-23 DIAGNOSIS — E29.1 HYPOGONADISM MALE: ICD-10-CM

## 2025-07-23 DIAGNOSIS — E11.9 TYPE 2 DIABETES MELLITUS WITHOUT COMPLICATION, WITHOUT LONG-TERM CURRENT USE OF INSULIN (HCC): Primary | ICD-10-CM

## 2025-07-23 DIAGNOSIS — F31.12 BIPOLAR AFFECTIVE DISORDER, CURRENTLY MANIC, MODERATE (HCC): ICD-10-CM

## 2025-07-23 LAB
25(OH)D3 SERPL-MCNC: 14 NG/ML (ref 30–100)
PROLACTIN SERPL-MCNC: 15 NG/ML
TESTOST SERPL-MCNC: 302 NG/DL (ref 193–740)

## 2025-07-23 PROCEDURE — 99214 OFFICE O/P EST MOD 30 MIN: CPT | Performed by: NURSE PRACTITIONER

## 2025-07-23 PROCEDURE — 3052F HG A1C>EQUAL 8.0%<EQUAL 9.0%: CPT | Performed by: NURSE PRACTITIONER

## 2025-07-23 ASSESSMENT — ENCOUNTER SYMPTOMS
GASTROINTESTINAL NEGATIVE: 1
EYES NEGATIVE: 1
RESPIRATORY NEGATIVE: 1

## 2025-07-23 NOTE — PROGRESS NOTES
Benjy Marquez is a 50 y.o. male who presents today for :  Chief Complaint   Patient presents with    Discuss Medications     bipolar     Vitals:    07/23/25 0856   BP: 120/78   Pulse: 79   Resp: 16   Temp: 97.7 °F (36.5 °C)       HPI:     History of Present Illness  The patient presents for a checkup and to discuss bipolar medications.    He reports that his initial bipolar medication was effective, but he experienced a significant episode in 11/2022. During this time, he noticed a change in his diet and began experiencing symptoms of PTSD. These symptoms persisted until he resumed regular eating habits, which coincided with an improvement in his condition. However, the first week on the new medication was challenging, with feelings of pressure behind his eyes and head, difficulty thinking clearly, and severe depression. He also experienced a strong urge to leave his house and drive aimlessly. These symptoms have been ongoing for the past 3 weeks.     He has tried various medications for his mental health, including dopamine agonists, but none have been effective. He is currently not taking any medication. He is considering Vraylar as a potential treatment option. He has previously taken Abilify and Seroquel, both of which resulted in serotonin syndrome. He has also tried Depakote, which did not help, and Wellbutrin. He has been on lithium several times, but it did not help stabilize his mood and caused stomach issues after about 6 months. He is hesitant to try new medications due to past experiences.    He has been monitoring his blood sugar levels intermittently, which have improved, with readings of 111 and 124 after fasting.    He has been experiencing spontaneous orgasms for the past 6 years, which have become more intense over the past 9 months. These episodes are triggered by various stimuli and leave him feeling weak. He has not discussed this issue with anyone else. He is unsure if these episodes are related

## 2025-08-04 DIAGNOSIS — J44.9 CHRONIC OBSTRUCTIVE PULMONARY DISEASE, UNSPECIFIED COPD TYPE (HCC): ICD-10-CM

## 2025-08-04 RX ORDER — ALBUTEROL SULFATE 90 UG/1
INHALANT RESPIRATORY (INHALATION)
Qty: 17 G | Refills: 1 | Status: SHIPPED | OUTPATIENT
Start: 2025-08-04

## 2025-08-28 ENCOUNTER — TELEMEDICINE (OUTPATIENT)
Dept: FAMILY MEDICINE CLINIC | Age: 50
End: 2025-08-28
Payer: COMMERCIAL

## 2025-08-28 DIAGNOSIS — E11.9 TYPE 2 DIABETES MELLITUS WITHOUT COMPLICATION, WITHOUT LONG-TERM CURRENT USE OF INSULIN (HCC): Primary | ICD-10-CM

## 2025-08-28 DIAGNOSIS — E55.9 VITAMIN D DEFICIENCY: ICD-10-CM

## 2025-08-28 DIAGNOSIS — F31.12 BIPOLAR AFFECTIVE DISORDER, CURRENTLY MANIC, MODERATE (HCC): ICD-10-CM

## 2025-08-28 DIAGNOSIS — J44.9 CHRONIC OBSTRUCTIVE PULMONARY DISEASE, UNSPECIFIED COPD TYPE (HCC): ICD-10-CM

## 2025-08-28 PROCEDURE — 3052F HG A1C>EQUAL 8.0%<EQUAL 9.0%: CPT | Performed by: NURSE PRACTITIONER

## 2025-08-28 PROCEDURE — 99213 OFFICE O/P EST LOW 20 MIN: CPT | Performed by: NURSE PRACTITIONER

## 2025-08-28 RX ORDER — PRAVASTATIN SODIUM 40 MG
40 TABLET ORAL EVERY EVENING
Qty: 90 TABLET | Refills: 3 | Status: SHIPPED | OUTPATIENT
Start: 2025-08-28 | End: 2026-08-28

## 2025-08-28 ASSESSMENT — ENCOUNTER SYMPTOMS
GASTROINTESTINAL NEGATIVE: 1
RESPIRATORY NEGATIVE: 1
EYES NEGATIVE: 1